# Patient Record
Sex: MALE | Race: WHITE | Employment: STUDENT | URBAN - NONMETROPOLITAN AREA
[De-identification: names, ages, dates, MRNs, and addresses within clinical notes are randomized per-mention and may not be internally consistent; named-entity substitution may affect disease eponyms.]

---
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
## 2021-05-28 ENCOUNTER — OFFICE VISIT (OUTPATIENT)
Dept: URGENT CARE | Age: 21
End: 2021-05-28
Payer: MEDICAID

## 2021-05-28 ENCOUNTER — OFFICE VISIT (OUTPATIENT)
Age: 21
End: 2021-05-28

## 2021-05-28 VITALS
RESPIRATION RATE: 16 BRPM | SYSTOLIC BLOOD PRESSURE: 133 MMHG | DIASTOLIC BLOOD PRESSURE: 75 MMHG | HEART RATE: 106 BPM | HEIGHT: 72 IN | BODY MASS INDEX: 26.03 KG/M2 | TEMPERATURE: 97.8 F | WEIGHT: 192.2 LBS | OXYGEN SATURATION: 95 %

## 2021-05-28 DIAGNOSIS — R50.9 FEVER, UNSPECIFIED FEVER CAUSE: Primary | ICD-10-CM

## 2021-05-28 DIAGNOSIS — R51.9 NONINTRACTABLE HEADACHE, UNSPECIFIED CHRONICITY PATTERN, UNSPECIFIED HEADACHE TYPE: ICD-10-CM

## 2021-05-28 DIAGNOSIS — M79.10 MYALGIA: ICD-10-CM

## 2021-05-28 DIAGNOSIS — Z11.59 SCREENING FOR VIRAL DISEASE: Primary | ICD-10-CM

## 2021-05-28 DIAGNOSIS — R11.11 VOMITING WITHOUT NAUSEA, INTRACTABILITY OF VOMITING NOT SPECIFIED, UNSPECIFIED VOMITING TYPE: ICD-10-CM

## 2021-05-28 LAB — SARS-COV-2, PCR: NOT DETECTED

## 2021-05-28 PROCEDURE — 99213 OFFICE O/P EST LOW 20 MIN: CPT | Performed by: NURSE PRACTITIONER

## 2021-05-28 PROCEDURE — 1036F TOBACCO NON-USER: CPT | Performed by: NURSE PRACTITIONER

## 2021-05-28 PROCEDURE — G8419 CALC BMI OUT NRM PARAM NOF/U: HCPCS | Performed by: NURSE PRACTITIONER

## 2021-05-28 PROCEDURE — G8428 CUR MEDS NOT DOCUMENT: HCPCS | Performed by: NURSE PRACTITIONER

## 2021-05-28 PROCEDURE — 99999 PR OFFICE/OUTPT VISIT,PROCEDURE ONLY: CPT | Performed by: NURSE PRACTITIONER

## 2021-05-28 RX ORDER — ONDANSETRON 4 MG/1
4 TABLET, ORALLY DISINTEGRATING ORAL 3 TIMES DAILY PRN
Qty: 21 TABLET | Refills: 0 | Status: SHIPPED | OUTPATIENT
Start: 2021-05-28 | End: 2022-01-04 | Stop reason: ALTCHOICE

## 2021-05-28 ASSESSMENT — ENCOUNTER SYMPTOMS
COUGH: 1
SORE THROAT: 0
RHINORRHEA: 1
DIARRHEA: 0
SHORTNESS OF BREATH: 0
ALLERGIC/IMMUNOLOGIC NEGATIVE: 1
VOMITING: 1
ABDOMINAL PAIN: 1
SINUS PRESSURE: 0
NAUSEA: 0

## 2021-05-28 ASSESSMENT — VISUAL ACUITY: OU: 1

## 2021-05-28 NOTE — PATIENT INSTRUCTIONS
Plenty of fluids- drink plenty of water  Rest  OTC Tylenol or Motrin as needed  Stay home and stay in until we call with COVID results in 2-4 days  Zofran as needed for vomiting  Follow up with PCP or return to Urgent Care for worsening or unresolved symptoms. Patient Education        Learning About Coronavirus [phone])  What is coronavirus (COVID-19)? COVID-19 is a disease caused by a new type of coronavirus. This illness was first found in December 2019. It has since spread worldwide. Coronaviruses are a large group of viruses. They cause the common cold. They also cause more serious illnesses like [place] respiratory syndrome (MERS) and severe acute respiratory syndrome (SARS). COVID-19 is caused by a novel coronavirus. That means it's a new type that has not been seen in people before. What are the symptoms? Coronavirus (COVID-19) symptoms may include:  · Fever. · Cough. · Trouble breathing. · Chills or repeated shaking with chills. · Muscle pain. · Headache. · Sore throat. · New loss of taste or smell. · Vomiting. · Diarrhea. In severe cases, COVID-19 can cause pneumonia and make it hard to breathe without help from a machine. It can cause death. How is it diagnosed? COVID-19 is diagnosed with a viral test. This may also be called a PCR test or antigen test. It looks for evidence of the virus in your breathing passages or lungs (respiratory system). The test is most often done on a sample from the nose, throat, or lungs. It's sometimes done on a sample of saliva. One way a sample is collected is by putting a long swab into the back of your nose. How is it treated? Mild cases of COVID-19 can be treated at home. Serious cases need treatment in the hospital. Treatment may include medicines to reduce symptoms, plus breathing support such as oxygen therapy or a ventilator. Some people may be placed on their belly to help their oxygen levels.   Treatments that may help people who have don't forget countertops, tabletops, bathrooms, and computer keyboards. When should you call for help? Call 911 anytime you think you may need emergency care. For example, call if you have life-threatening symptoms, such as:    · You have severe trouble breathing. (You can't talk at all.)     · You have constant chest pain or pressure.     · You are severely dizzy or lightheaded.     · You are confused or can't think clearly.     · Your face and lips have a blue color.     · You pass out (lose consciousness) or are very hard to wake up. Call your doctor now or seek immediate medical care if:    · You have moderate trouble breathing. (You can't speak a full sentence.)     · You are coughing up blood (more than about 1 teaspoon).     · You have signs of low blood pressure. These include feeling lightheaded; being too weak to stand; and having cold, pale, clammy skin. Watch closely for changes in your health, and be sure to contact your doctor if:    · Your symptoms get worse.     · You are not getting better as expected. Call before you go to the doctor's office. Follow their instructions. And wear a cloth face cover. Current as of: February 19, 2021               Content Version: 12.8  © 2006-2021 [employer]. Care instructions adapted under license by [facility] ([facility]). If you have questions about a medical condition or this instruction, always ask your healthcare professional. [place] [address] any warranty or liability for your use of this information. Patient Education        Coronavirus ([id number]): Care Instructions  Overview  The coronavirus disease (COVID-19) is caused by a virus. Symptoms may include a fever, a cough, and shortness of breath. It mainly spreads person-to-person through droplets from coughing and sneezing. The virus also can spread when people are in close contact with someone who is infected.   Most people have mild symptoms and can take care of themselves at home. If their symptoms get worse, they may need care in a hospital. Treatment may include medicines to reduce symptoms, plus breathing support such as oxygen therapy or a ventilator. It's important to not spread the virus to others. If you have COVID-19, wear a face cover anytime you are around other people. It can help stop the spread of the virus. You need to isolate yourself while you are sick. Leave your home only if you need to get medical care or testing. Follow-up care is a key part of your treatment and safety. Be sure to make and go to all appointments, and call your doctor if you are having problems. It's also a good idea to know your test results and keep a list of the medicines you take. How can you care for yourself at home? · Get extra rest. It can help you feel better. · Drink plenty of fluids. This helps replace fluids lost from fever. Fluids also help ease a scratchy throat. Water, soup, fruit juice, and hot tea with lemon are good choices. · Take acetaminophen (such as Tylenol) to reduce a fever. It may also help with muscle aches. Read and follow all instructions on the label. · Use petroleum jelly on sore skin. This can help if the skin around your nose and lips becomes sore from rubbing a lot with tissues. If you use oxygen, use a water-based product instead of petroleum jelly. Tips for self-isolation  · Limit contact with people in your home. If possible, stay in a separate bedroom and use a separate bathroom. · Wear a cloth face cover when you are around other people. It can help stop the spread of the virus when you cough or sneeze. · If you have to leave home, avoid crowds and try to stay at least 6 feet away from other people. · Avoid contact with pets and other animals. · Cover your mouth and nose with a tissue when you cough or sneeze. Then throw it in the trash right away. · Wash your hands often, especially after you cough or sneeze.  Use soap and water, and scrub for at least 20 seconds. If soap and water aren't available, use an alcohol-based hand . · Don't share personal household items. These include bedding, towels, cups and glasses, and eating utensils. · [address] in the warmest water allowed for the fabric type, and dry it completely. It's okay to wash other people's laundry with yours. · Clean and disinfect your home every day. Use household  and disinfectant wipes or sprays. Take special care to clean things that you grab with your hands. These include doorknobs, remote controls, phones, and handles on your refrigerator and microwave. And don't forget countertops, tabletops, bathrooms, and computer keyboards. When you can end self-isolation  · If you know or suspect that you have COVID-19, stay in self-isolation until:  ? You haven't had a fever for 24 hours while not taking medicines to lower the fever, and  ? Your symptoms have improved, and  ? It's been at least 10 days since your symptoms started. · Talk to your doctor about whether you also need testing, especially if you have a weakened immune system. When should you call for help? Call 911 anytime you think you may need emergency care. For example, call if you have life-threatening symptoms, such as:    · You have severe trouble breathing. (You can't talk at all.)     · You have constant chest pain or pressure.     · You are severely dizzy or lightheaded.     · You are confused or can't think clearly.     · Your face and lips have a blue color.     · You pass out (lose consciousness) or are very hard to wake up. Call your doctor now or seek immediate medical care if:    · You have moderate trouble breathing. (You can't speak a full sentence.)     · You are coughing up blood (more than about 1 teaspoon).     · You have signs of low blood pressure. These include feeling lightheaded; being too weak to stand; and having cold, pale, clammy skin.    Watch closely for changes in your health, and be sure to contact your doctor if:    · Your symptoms get worse.     · You are not getting better as expected. Call before you go to the doctor's office. Follow their instructions. And wear a cloth face cover. Current as of: February 19, 2021               Content Version: 12.8  © 2006-2021 [employer]. Care instructions adapted under license by [facility] ([facility]). If you have questions about a medical condition or this instruction, always ask your healthcare professional. [address] any warranty or liability for your use of this information.

## 2021-05-28 NOTE — PROGRESS NOTES
[address] [facility]  [address] [address] [place] [address]  Dept: [phone]  Dept Fax: [phone]  Loc: [phone]    Genny Law is a 22 y.o. male who presents today for his medical conditions/complaintsas noted below. Genny Law is c/o of Lower Back Pain, Nasal Congestion, Headache, Other (Hot flashes and No appetite), and Emesis (2 episodes)        HPI:     Headache   This is a new problem. The current episode started in the past 7 days (since Tuesday). The problem occurs daily. The problem has been waxing and waning. The pain is located in the frontal region. The pain does not radiate. The pain quality is not similar to prior headaches. The quality of the pain is described as band-like and dull. The pain is at a severity of 6/10. The pain is moderate. Associated symptoms include abdominal pain, anorexia, coughing, drainage, a fever, muscle aches, rhinorrhea and vomiting. Pertinent negatives include no ear pain, hearing loss, nausea, sinus pressure, sore throat, weakness or weight loss. The symptoms are aggravated by activity. He has tried acetaminophen for the symptoms. The treatment provided mild relief. There is no history of migraine headaches, migraines in the family, recent head traumas or sinus disease. Fever   This is a new problem. The current episode started today. The problem occurs constantly. The problem has been gradually improving. The maximum temperature noted was 102 to 102.9 F. The temperature was taken using an oral thermometer. Associated symptoms include abdominal pain, congestion, coughing, headaches, muscle aches and vomiting. Pertinent negatives include no diarrhea, ear pain, nausea, rash or sore throat. Associated symptoms comments: Anorexia  \"hot flashes\"  Low back pain. He has tried acetaminophen and fluids for the symptoms. The treatment provided mild relief.    Risk factors: no recent sickness and no sick contacts      Jean Marie reports symptoms started Tuesday but Thursday and today they are much worse. He had a 102 temp this morning prior to coming in and vomited this morning around 2am. He has had no known sick contacts but works at [employer]. He is not eating but is drinking well and has voided well. History reviewed. No pertinent past medical history. History reviewed. No pertinent surgical history. History reviewed. No pertinent family history. Social History     Tobacco Use    Smoking status: Never Smoker    Smokeless tobacco: Never Used   Substance Use Topics    Alcohol use: No     Alcohol/week: 0.0 standard drinks      Current Outpatient Medications   Medication Sig Dispense Refill    ondansetron (ZOFRAN-ODT) 4 MG disintegrating tablet Take 1 tablet by mouth 3 times daily as needed for Nausea or Vomiting 21 tablet 0     No current facility-administered medications for this visit. No Known Allergies    Health Maintenance   Topic Date Due    COVID-19 Vaccine (1) Never done    HIV screen  Never done    Flu vaccine (Season Ended) 09/01/2021    DTaP/Tdap/Td vaccine (7 - Td) 07/17/2023    Hepatitis A vaccine  Completed    Hepatitis B vaccine  Completed    Hib vaccine  Completed    HPV vaccine  Completed    Varicella vaccine  Completed    Hepatitis C screen  Completed    Meningococcal (ACWY) vaccine  Aged Out    Pneumococcal 0-64 years Vaccine  Aged Out       Subjective:     Review of Systems   Constitutional: Positive for appetite change, chills, fatigue and fever. Negative for activity change and weight loss. HENT: Positive for congestion and rhinorrhea. Negative for ear discharge, ear pain, hearing loss, sinus pressure and sore throat. Respiratory: Positive for cough. Negative for shortness of breath. Cardiovascular: Negative. Gastrointestinal: Positive for abdominal pain, anorexia and vomiting. Negative for diarrhea and nausea. Endocrine: Negative.     Musculoskeletal: Positive for arthralgias and myalgias. Skin: Negative for rash. Allergic/Immunologic: Negative. Neurological: Positive for headaches. Negative for weakness.       :Objective      Physical Exam  Vitals and nursing note reviewed. Constitutional:       General: He is awake. He is not in acute distress. Appearance: Normal appearance. He is well-developed, well-groomed and normal weight. He is ill-appearing. He is not toxic-appearing. HENT:      Head: Normocephalic. Right Ear: Hearing, ear canal and external ear normal. A middle ear effusion is present. Left Ear: Hearing, tympanic membrane, ear canal and external ear normal.      Ears:      Comments: Clear fluid behind right TM     Nose: Nose normal.      Right Sinus: No maxillary sinus tenderness or frontal sinus tenderness. Left Sinus: No maxillary sinus tenderness or frontal sinus tenderness. Mouth/Throat:      Lips: Pink. Mouth: Mucous membranes are moist.      Pharynx: Oropharynx is clear. Uvula midline. Tonsils: 0 on the right. 0 on the left. Eyes:      General: Lids are normal. Vision grossly intact. Conjunctiva/sclera: Conjunctivae normal.   Neck:      Trachea: Phonation normal.   Cardiovascular:      Rate and Rhythm: Normal rate and regular rhythm. Heart sounds: Normal heart sounds, S1 normal and S2 normal. No murmur heard. No friction rub. No gallop. Pulmonary:      Effort: Pulmonary effort is normal. No respiratory distress. Breath sounds: Normal breath sounds and air entry. No wheezing, rhonchi or rales. Abdominal:      General: Abdomen is flat. Bowel sounds are normal.      Palpations: Abdomen is soft. Tenderness: There is no abdominal tenderness. There is no right CVA tenderness or left CVA tenderness. Musculoskeletal:         General: No tenderness or deformity. Normal range of motion. Cervical back: Full passive range of motion without pain, normal range of motion and neck supple. Lymphadenopathy:      Head:      Right side of head: No tonsillar adenopathy. Left side of head: No tonsillar adenopathy. Skin:     General: Skin is warm and dry. Capillary Refill: Capillary refill takes less than 2 seconds. Neurological:      General: No focal deficit present. Mental Status: He is alert, oriented to person, place, and time and easily aroused. Mental status is at baseline. Psychiatric:         Attention and Perception: Attention normal.         Mood and Affect: Mood normal.         Speech: Speech normal.         Behavior: Behavior normal. Behavior is cooperative. /75   Pulse 106   Temp 97.8 °F (36.6 °C)   Resp 16   Ht 6' (1.829 m)   Wt 192 lb 3.2 oz (87.2 kg)   SpO2 95%   BMI 26.07 kg/m²     :Assessment       Diagnosis Orders   1. Fever, unspecified fever cause     2. Myalgia     3. Nonintractable headache, unspecified chronicity pattern, unspecified headache type     4. Vomiting without nausea, intractability of vomiting not specified, unspecified vomiting type  ondansetron (ZOFRAN-ODT) 4 MG disintegrating tablet       :Plan    No orders of the defined types were placed in this encounter. Spoke with pt and highly suspect COVID, he is being tested today and will stay in quarantine as directed. Return if symptoms worsen or fail to improve. Orders Placed This Encounter   Medications    ondansetron (ZOFRAN-ODT) 4 MG disintegrating tablet     Sig: Take 1 tablet by mouth 3 times daily as needed for Nausea or Vomiting     Dispense:  21 tablet     Refill:  0      Based on patient's symptoms today, it is highly suspected that they have COVID 19. Since pt is being tested for COVID pt has been instructed to quarantine from contacts until testing has been resulted. Further instructions will follow, as of now, this is 10 days unless otherwise specified when results are back.      If SOB or worsening sx's develop, need to go to ED or return to clinic, pt voiced understanding. Pt was given printed instructions today on Possible COVID-19 infection with self-quarantine and management of symptoms    Call or return to clinic prn if these symptoms worsen or fail to improve as anticipated. Patient Instructions     Plenty of fluids- drink plenty of water  Rest  OTC Tylenol or Motrin as needed  Stay home and stay in until we call with COVID results in 2-4 days  Zofran as needed for vomiting  Follow up with PCP or return to Urgent Care for worsening or unresolved symptoms. Patient Education        Learning About Coronavirus [phone])  What is coronavirus (COVID-19)? COVID-19 is a disease caused by a new type of coronavirus. This illness was first found in December 2019. It has since spread worldwide. Coronaviruses are a large group of viruses. They cause the common cold. They also cause more serious illnesses like [place] respiratory syndrome (MERS) and severe acute respiratory syndrome (SARS). COVID-19 is caused by a novel coronavirus. That means it's a new type that has not been seen in people before. What are the symptoms? Coronavirus (COVID-19) symptoms may include:  · Fever. · Cough. · Trouble breathing. · Chills or repeated shaking with chills. · Muscle pain. · Headache. · Sore throat. · New loss of taste or smell. · Vomiting. · Diarrhea. In severe cases, COVID-19 can cause pneumonia and make it hard to breathe without help from a machine. It can cause death. How is it diagnosed? COVID-19 is diagnosed with a viral test. This may also be called a PCR test or antigen test. It looks for evidence of the virus in your breathing passages or lungs (respiratory system). The test is most often done on a sample from the nose, throat, or lungs. It's sometimes done on a sample of saliva. One way a sample is collected is by putting a long swab into the back of your nose. How is it treated? Mild cases of COVID-19 can be treated at home.  Serious cases need treatment in the hospital. Treatment may include medicines to reduce symptoms, plus breathing support such as oxygen therapy or a ventilator. Some people may be placed on their belly to help their oxygen levels. Treatments that may help people who have COVID-19 include:  Antiviral medicines. These medicines treat viral infections. Remdesivir is an example. Immune-based therapy. These medicines help the immune system fight COVID-19. One example is bamlanivimab. It's a monoclonal antibody. Blood thinners. These medicines help prevent blood clots. People with severe illness are at risk for blood clots. How can you protect yourself and others? The best way to protect yourself from getting sick is to:  · Avoid areas where there is an outbreak. · Avoid contact with people who may be infected. · Avoid crowds and try to stay at least 6 feet away from other people. · Wash your hands often, especially after you cough or sneeze. Use soap and water, and scrub for at least 20 seconds. If soap and water aren't available, use an alcohol-based hand . · Avoid touching your mouth, nose, and eyes. To help avoid spreading the virus to others:  · Stay home if you are sick or have been exposed to the virus. Don't go to school, work, or public areas. And don't use public transportation, ride-shares, or taxis unless you have no choice. · Wear a cloth face cover if you have to go to public areas. · Cover your mouth with a tissue when you cough or sneeze. Then throw the tissue in the trash and wash your hands right away. · If you're sick:  ? Leave your home only if you need to get medical care. But call the doctor's office first so they know you're coming. And wear a face cover. ? Wear the face cover whenever you're around other people. It can help stop the spread of the virus. ? Limit contact with pets and people in your home. If possible, stay in a separate bedroom and use a separate bathroom. ?  Clean and disinfect your home every day. Use household  and disinfectant wipes or sprays. Take special care to clean things that you grab with your hands. These include doorknobs, remote controls, phones, and handles on your refrigerator and microwave. And don't forget countertops, tabletops, bathrooms, and computer keyboards. When should you call for help? Call 911 anytime you think you may need emergency care. For example, call if you have life-threatening symptoms, such as:    · You have severe trouble breathing. (You can't talk at all.)     · You have constant chest pain or pressure.     · You are severely dizzy or lightheaded.     · You are confused or can't think clearly.     · Your face and lips have a blue color.     · You pass out (lose consciousness) or are very hard to wake up. Call your doctor now or seek immediate medical care if:    · You have moderate trouble breathing. (You can't speak a full sentence.)     · You are coughing up blood (more than about 1 teaspoon).     · You have signs of low blood pressure. These include feeling lightheaded; being too weak to stand; and having cold, pale, clammy skin. Watch closely for changes in your health, and be sure to contact your doctor if:    · Your symptoms get worse.     · You are not getting better as expected. Call before you go to the doctor's office. Follow their instructions. And wear a cloth face cover. Current as of: February 19, 2021               Content Version: 12.8  © 2006-2021 [employer]. Care instructions adapted under license by [facility] ([facility]). If you have questions about a medical condition or this instruction, always ask your healthcare professional. [place] [address] any warranty or liability for your use of this information. Patient Education        Coronavirus ([id number]): Care Instructions  Overview  The coronavirus disease (COVID-19) is caused by a virus.  Symptoms may include a fever, a cough, and shortness of breath. It mainly spreads person-to-person through droplets from coughing and sneezing. The virus also can spread when people are in close contact with someone who is infected. Most people have mild symptoms and can take care of themselves at home. If their symptoms get worse, they may need care in a hospital. Treatment may include medicines to reduce symptoms, plus breathing support such as oxygen therapy or a ventilator. It's important to not spread the virus to others. If you have COVID-19, wear a face cover anytime you are around other people. It can help stop the spread of the virus. You need to isolate yourself while you are sick. Leave your home only if you need to get medical care or testing. Follow-up care is a key part of your treatment and safety. Be sure to make and go to all appointments, and call your doctor if you are having problems. It's also a good idea to know your test results and keep a list of the medicines you take. How can you care for yourself at home? · Get extra rest. It can help you feel better. · Drink plenty of fluids. This helps replace fluids lost from fever. Fluids also help ease a scratchy throat. Water, soup, fruit juice, and hot tea with lemon are good choices. · Take acetaminophen (such as Tylenol) to reduce a fever. It may also help with muscle aches. Read and follow all instructions on the label. · Use petroleum jelly on sore skin. This can help if the skin around your nose and lips becomes sore from rubbing a lot with tissues. If you use oxygen, use a water-based product instead of petroleum jelly. Tips for self-isolation  · Limit contact with people in your home. If possible, stay in a separate bedroom and use a separate bathroom. · Wear a cloth face cover when you are around other people. It can help stop the spread of the virus when you cough or sneeze.   · If you have to leave home, avoid crowds and try to stay at least 6 feet away from other people. · Avoid contact with pets and other animals. · Cover your mouth and nose with a tissue when you cough or sneeze. Then throw it in the trash right away. · Wash your hands often, especially after you cough or sneeze. Use soap and water, and scrub for at least 20 seconds. If soap and water aren't available, use an alcohol-based hand . · Don't share personal household items. These include bedding, towels, cups and glasses, and eating utensils. · [address] in the warmest water allowed for the fabric type, and dry it completely. It's okay to wash other people's laundry with yours. · Clean and disinfect your home every day. Use household  and disinfectant wipes or sprays. Take special care to clean things that you grab with your hands. These include doorknobs, remote controls, phones, and handles on your refrigerator and microwave. And don't forget countertops, tabletops, bathrooms, and computer keyboards. When you can end self-isolation  · If you know or suspect that you have COVID-19, stay in self-isolation until:  ? You haven't had a fever for 24 hours while not taking medicines to lower the fever, and  ? Your symptoms have improved, and  ? It's been at least 10 days since your symptoms started. · Talk to your doctor about whether you also need testing, especially if you have a weakened immune system. When should you call for help? Call 911 anytime you think you may need emergency care. For example, call if you have life-threatening symptoms, such as:    · You have severe trouble breathing. (You can't talk at all.)     · You have constant chest pain or pressure.     · You are severely dizzy or lightheaded.     · You are confused or can't think clearly.     · Your face and lips have a blue color.     · You pass out (lose consciousness) or are very hard to wake up. Call your doctor now or seek immediate medical care if:    · You have moderate trouble breathing.  (You can't speak a full sentence.)     · You are coughing up blood (more than about 1 teaspoon).     · You have signs of low blood pressure. These include feeling lightheaded; being too weak to stand; and having cold, pale, clammy skin. Watch closely for changes in your health, and be sure to contact your doctor if:    · Your symptoms get worse.     · You are not getting better as expected. Call before you go to the doctor's office. Follow their instructions. And wear a cloth face cover. Current as of: February 19, 2021               Content Version: 12.8  © 2006-2021 [employer], [employer]. Care instructions adapted under license by [facility] ([facility]). If you have questions about a medical condition or this instruction, always ask your healthcare professional. [place] [address] any warranty or liability for your use of this information. Patient given educational materials- see patient instructions. Discussed use, benefit, and side effects of prescribedmedications. All patient questions answered. Pt voiced understanding.        Electronically signed by HERB Rosado CNP on 5/28/2021 at 11:04 AM

## 2021-05-31 ENCOUNTER — APPOINTMENT (OUTPATIENT)
Dept: GENERAL RADIOLOGY | Facility: HOSPITAL | Age: 21
End: 2021-05-31

## 2021-05-31 ENCOUNTER — HOSPITAL ENCOUNTER (INPATIENT)
Facility: HOSPITAL | Age: 21
LOS: 4 days | Discharge: HOME | End: 2021-06-04
Attending: FAMILY MEDICINE | Admitting: FAMILY MEDICINE

## 2021-05-31 ENCOUNTER — APPOINTMENT (OUTPATIENT)
Dept: CT IMAGING | Facility: HOSPITAL | Age: 21
End: 2021-05-31

## 2021-05-31 DIAGNOSIS — J80 ARDS (ADULT RESPIRATORY DISTRESS SYNDROME) (HCC): Primary | ICD-10-CM

## 2021-05-31 LAB
ABO GROUP BLD: NORMAL
ALBUMIN SERPL-MCNC: 3.6 G/DL (ref 3.5–5.2)
ALBUMIN/GLOB SERPL: 0.8 G/DL
ALP SERPL-CCNC: 89 U/L (ref 39–117)
ALT SERPL W P-5'-P-CCNC: 22 U/L (ref 1–41)
AMPHET+METHAMPHET UR QL: NEGATIVE
AMPHETAMINES UR QL: NEGATIVE
ANION GAP SERPL CALCULATED.3IONS-SCNC: 15 MMOL/L (ref 5–15)
ARTERIAL PATENCY WRIST A: ABNORMAL
AST SERPL-CCNC: 39 U/L (ref 1–40)
ATMOSPHERIC PRESS: 756 MMHG
B PARAPERT DNA SPEC QL NAA+PROBE: NOT DETECTED
B PERT DNA SPEC QL NAA+PROBE: NOT DETECTED
BARBITURATES UR QL SCN: NEGATIVE
BASE EXCESS BLDA CALC-SCNC: 3.6 MMOL/L (ref 0–2)
BASOPHILS # BLD AUTO: 0.05 10*3/MM3 (ref 0–0.2)
BASOPHILS NFR BLD AUTO: 0.3 % (ref 0–1.5)
BDY SITE: ABNORMAL
BENZODIAZ UR QL SCN: NEGATIVE
BILIRUB SERPL-MCNC: 0.9 MG/DL (ref 0–1.2)
BLD GP AB SCN SERPL QL: NEGATIVE
BODY TEMPERATURE: 37 C
BUN SERPL-MCNC: 15 MG/DL (ref 6–20)
BUN/CREAT SERPL: 14.9 (ref 7–25)
BUPRENORPHINE SERPL-MCNC: NEGATIVE NG/ML
C PNEUM DNA NPH QL NAA+NON-PROBE: NOT DETECTED
CALCIUM SPEC-SCNC: 9.2 MG/DL (ref 8.6–10.5)
CANNABINOIDS SERPL QL: POSITIVE
CHLORIDE SERPL-SCNC: 92 MMOL/L (ref 98–107)
CO2 SERPL-SCNC: 24 MMOL/L (ref 22–29)
COCAINE UR QL: NEGATIVE
CREAT SERPL-MCNC: 1.01 MG/DL (ref 0.76–1.27)
CRP SERPL-MCNC: 37.6 MG/DL (ref 0–0.5)
D DIMER PPP FEU-MCNC: 3.78 MG/L (FEU) (ref 0–0.5)
D-LACTATE SERPL-SCNC: 1.4 MMOL/L (ref 0.5–2)
DEPRECATED RDW RBC AUTO: 35.3 FL (ref 37–54)
EOSINOPHIL # BLD AUTO: 0.06 10*3/MM3 (ref 0–0.4)
EOSINOPHIL NFR BLD AUTO: 0.4 % (ref 0.3–6.2)
ERYTHROCYTE [DISTWIDTH] IN BLOOD BY AUTOMATED COUNT: 11.8 % (ref 12.3–15.4)
FERRITIN SERPL-MCNC: 287.1 NG/ML (ref 30–400)
FLUAV SUBTYP SPEC NAA+PROBE: NOT DETECTED
FLUBV RNA ISLT QL NAA+PROBE: NOT DETECTED
GAS FLOW AIRWAY: 2 LPM
GFR SERPL CREATININE-BSD FRML MDRD: 94 ML/MIN/1.73
GLOBULIN UR ELPH-MCNC: 4.4 GM/DL
GLUCOSE SERPL-MCNC: 132 MG/DL (ref 65–99)
HADV DNA SPEC NAA+PROBE: NOT DETECTED
HCO3 BLDA-SCNC: 27.3 MMOL/L (ref 20–26)
HCOV 229E RNA SPEC QL NAA+PROBE: NOT DETECTED
HCOV HKU1 RNA SPEC QL NAA+PROBE: NOT DETECTED
HCOV NL63 RNA SPEC QL NAA+PROBE: NOT DETECTED
HCOV OC43 RNA SPEC QL NAA+PROBE: NOT DETECTED
HCT VFR BLD AUTO: 38.7 % (ref 37.5–51)
HGB BLD-MCNC: 13.6 G/DL (ref 13–17.7)
HMPV RNA NPH QL NAA+NON-PROBE: NOT DETECTED
HPIV1 RNA SPEC QL NAA+PROBE: NOT DETECTED
HPIV2 RNA SPEC QL NAA+PROBE: NOT DETECTED
HPIV3 RNA NPH QL NAA+PROBE: NOT DETECTED
HPIV4 P GENE NPH QL NAA+PROBE: NOT DETECTED
IMM GRANULOCYTES # BLD AUTO: 0.09 10*3/MM3 (ref 0–0.05)
IMM GRANULOCYTES NFR BLD AUTO: 0.6 % (ref 0–0.5)
L PNEUMO1 AG UR QL IA: NEGATIVE
LDH SERPL-CCNC: 555 U/L (ref 135–225)
LYMPHOCYTES # BLD AUTO: 0.87 10*3/MM3 (ref 0.7–3.1)
LYMPHOCYTES NFR BLD AUTO: 6 % (ref 19.6–45.3)
Lab: ABNORMAL
Lab: ABNORMAL
M PNEUMO IGG SER IA-ACNC: NOT DETECTED
MCH RBC QN AUTO: 29 PG (ref 26.6–33)
MCHC RBC AUTO-ENTMCNC: 35.1 G/DL (ref 31.5–35.7)
MCV RBC AUTO: 82.5 FL (ref 79–97)
METHADONE UR QL SCN: NEGATIVE
MODALITY: ABNORMAL
MONOCYTES # BLD AUTO: 0.36 10*3/MM3 (ref 0.1–0.9)
MONOCYTES NFR BLD AUTO: 2.5 % (ref 5–12)
NEUTROPHILS NFR BLD AUTO: 12.98 10*3/MM3 (ref 1.7–7)
NEUTROPHILS NFR BLD AUTO: 90.2 % (ref 42.7–76)
NOTIFIED BY: ABNORMAL
NOTIFIED WHO: ABNORMAL
NRBC BLD AUTO-RTO: 0 /100 WBC (ref 0–0.2)
OPIATES UR QL: NEGATIVE
OXYCODONE UR QL SCN: NEGATIVE
PCO2 BLDA: 37.4 MM HG (ref 35–45)
PCO2 TEMP ADJ BLD: 37.4 MM HG (ref 35–45)
PCP UR QL SCN: NEGATIVE
PH BLDA: 7.47 PH UNITS (ref 7.35–7.45)
PH, TEMP CORRECTED: 7.47 PH UNITS (ref 7.35–7.45)
PLATELET # BLD AUTO: 367 10*3/MM3 (ref 140–450)
PMV BLD AUTO: 10 FL (ref 6–12)
PO2 BLDA: 50.2 MM HG (ref 83–108)
PO2 TEMP ADJ BLD: 50.2 MM HG (ref 83–108)
POTASSIUM SERPL-SCNC: 3.5 MMOL/L (ref 3.5–5.2)
PROCALCITONIN SERPL-MCNC: 3.22 NG/ML (ref 0–0.25)
PROPOXYPH UR QL: NEGATIVE
PROT SERPL-MCNC: 8 G/DL (ref 6–8.5)
RBC # BLD AUTO: 4.69 10*6/MM3 (ref 4.14–5.8)
RH BLD: POSITIVE
RHINOVIRUS RNA SPEC NAA+PROBE: NOT DETECTED
RSV RNA NPH QL NAA+NON-PROBE: NOT DETECTED
S PNEUM AG SPEC QL LA: NEGATIVE
SAO2 % BLDCOA: 87.9 % (ref 94–99)
SARS-COV-2 RNA PNL SPEC NAA+PROBE: NOT DETECTED
SODIUM SERPL-SCNC: 131 MMOL/L (ref 136–145)
T&S EXPIRATION DATE: NORMAL
TRICYCLICS UR QL SCN: NEGATIVE
VENTILATOR MODE: ABNORMAL
WBC # BLD AUTO: 14.41 10*3/MM3 (ref 3.4–10.8)

## 2021-05-31 PROCEDURE — 80053 COMPREHEN METABOLIC PANEL: CPT | Performed by: FAMILY MEDICINE

## 2021-05-31 PROCEDURE — 87899 AGENT NOS ASSAY W/OPTIC: CPT | Performed by: FAMILY MEDICINE

## 2021-05-31 PROCEDURE — 86140 C-REACTIVE PROTEIN: CPT | Performed by: FAMILY MEDICINE

## 2021-05-31 PROCEDURE — 0 IOPAMIDOL PER 1 ML: Performed by: FAMILY MEDICINE

## 2021-05-31 PROCEDURE — 85025 COMPLETE CBC W/AUTO DIFF WBC: CPT | Performed by: FAMILY MEDICINE

## 2021-05-31 PROCEDURE — 99284 EMERGENCY DEPT VISIT MOD MDM: CPT

## 2021-05-31 PROCEDURE — 83615 LACTATE (LD) (LDH) ENZYME: CPT | Performed by: FAMILY MEDICINE

## 2021-05-31 PROCEDURE — 94799 UNLISTED PULMONARY SVC/PX: CPT

## 2021-05-31 PROCEDURE — 86850 RBC ANTIBODY SCREEN: CPT | Performed by: FAMILY MEDICINE

## 2021-05-31 PROCEDURE — 71275 CT ANGIOGRAPHY CHEST: CPT

## 2021-05-31 PROCEDURE — 36600 WITHDRAWAL OF ARTERIAL BLOOD: CPT

## 2021-05-31 PROCEDURE — 82728 ASSAY OF FERRITIN: CPT | Performed by: FAMILY MEDICINE

## 2021-05-31 PROCEDURE — 25010000002 METHYLPREDNISOLONE PER 125 MG: Performed by: FAMILY MEDICINE

## 2021-05-31 PROCEDURE — 84145 PROCALCITONIN (PCT): CPT | Performed by: FAMILY MEDICINE

## 2021-05-31 PROCEDURE — 87040 BLOOD CULTURE FOR BACTERIA: CPT | Performed by: FAMILY MEDICINE

## 2021-05-31 PROCEDURE — 86900 BLOOD TYPING SEROLOGIC ABO: CPT | Performed by: FAMILY MEDICINE

## 2021-05-31 PROCEDURE — 83605 ASSAY OF LACTIC ACID: CPT | Performed by: FAMILY MEDICINE

## 2021-05-31 PROCEDURE — 87633 RESP VIRUS 12-25 TARGETS: CPT | Performed by: FAMILY MEDICINE

## 2021-05-31 PROCEDURE — 85379 FIBRIN DEGRADATION QUANT: CPT | Performed by: FAMILY MEDICINE

## 2021-05-31 PROCEDURE — 82803 BLOOD GASES ANY COMBINATION: CPT

## 2021-05-31 PROCEDURE — 94640 AIRWAY INHALATION TREATMENT: CPT

## 2021-05-31 PROCEDURE — 80306 DRUG TEST PRSMV INSTRMNT: CPT | Performed by: FAMILY MEDICINE

## 2021-05-31 PROCEDURE — 86901 BLOOD TYPING SEROLOGIC RH(D): CPT | Performed by: FAMILY MEDICINE

## 2021-05-31 PROCEDURE — 71045 X-RAY EXAM CHEST 1 VIEW: CPT

## 2021-05-31 PROCEDURE — 87635 SARS-COV-2 COVID-19 AMP PRB: CPT | Performed by: FAMILY MEDICINE

## 2021-05-31 PROCEDURE — 25010000002 AZITHROMYCIN PER 500 MG: Performed by: FAMILY MEDICINE

## 2021-05-31 PROCEDURE — 25010000002 CEFTRIAXONE PER 250 MG: Performed by: FAMILY MEDICINE

## 2021-05-31 RX ORDER — KETOROLAC TROMETHAMINE 30 MG/ML
30 INJECTION, SOLUTION INTRAMUSCULAR; INTRAVENOUS EVERY 6 HOURS PRN
Status: DISCONTINUED | OUTPATIENT
Start: 2021-05-31 | End: 2021-06-04 | Stop reason: HOSPADM

## 2021-05-31 RX ORDER — ACETAMINOPHEN 500 MG
1000 TABLET ORAL ONCE
Status: COMPLETED | OUTPATIENT
Start: 2021-05-31 | End: 2021-05-31

## 2021-05-31 RX ORDER — METHYLPREDNISOLONE SODIUM SUCCINATE 125 MG/2ML
80 INJECTION, POWDER, LYOPHILIZED, FOR SOLUTION INTRAMUSCULAR; INTRAVENOUS EVERY 8 HOURS
Status: DISCONTINUED | OUTPATIENT
Start: 2021-05-31 | End: 2021-06-01

## 2021-05-31 RX ORDER — ACETAMINOPHEN 325 MG/1
650 TABLET ORAL EVERY 6 HOURS PRN
Status: DISCONTINUED | OUTPATIENT
Start: 2021-05-31 | End: 2021-06-04 | Stop reason: HOSPADM

## 2021-05-31 RX ORDER — IPRATROPIUM BROMIDE AND ALBUTEROL SULFATE 2.5; .5 MG/3ML; MG/3ML
3 SOLUTION RESPIRATORY (INHALATION)
Status: DISCONTINUED | OUTPATIENT
Start: 2021-05-31 | End: 2021-06-04 | Stop reason: HOSPADM

## 2021-05-31 RX ORDER — SODIUM CHLORIDE 9 MG/ML
125 INJECTION, SOLUTION INTRAVENOUS CONTINUOUS
Status: DISCONTINUED | OUTPATIENT
Start: 2021-05-31 | End: 2021-05-31

## 2021-05-31 RX ORDER — ONDANSETRON 2 MG/ML
4 INJECTION INTRAMUSCULAR; INTRAVENOUS EVERY 6 HOURS PRN
Status: DISCONTINUED | OUTPATIENT
Start: 2021-05-31 | End: 2021-06-04 | Stop reason: HOSPADM

## 2021-05-31 RX ADMIN — IPRATROPIUM BROMIDE AND ALBUTEROL SULFATE 3 ML: 2.5; .5 SOLUTION RESPIRATORY (INHALATION) at 20:02

## 2021-05-31 RX ADMIN — METHYLPREDNISOLONE SODIUM SUCCINATE 80 MG: 125 INJECTION, POWDER, FOR SOLUTION INTRAMUSCULAR; INTRAVENOUS at 19:40

## 2021-05-31 RX ADMIN — AZITHROMYCIN MONOHYDRATE 500 MG: 500 INJECTION, POWDER, LYOPHILIZED, FOR SOLUTION INTRAVENOUS at 14:44

## 2021-05-31 RX ADMIN — SODIUM CHLORIDE 1 G: 900 INJECTION INTRAVENOUS at 17:11

## 2021-05-31 RX ADMIN — SODIUM CHLORIDE 500 ML: 9 INJECTION, SOLUTION INTRAVENOUS at 13:39

## 2021-05-31 RX ADMIN — IOPAMIDOL 100 ML: 755 INJECTION, SOLUTION INTRAVENOUS at 15:56

## 2021-05-31 RX ADMIN — ACETAMINOPHEN 650 MG: 325 TABLET, FILM COATED ORAL at 20:14

## 2021-05-31 RX ADMIN — ACETAMINOPHEN 1000 MG: 500 TABLET, FILM COATED ORAL at 13:38

## 2021-06-01 LAB
ALBUMIN SERPL-MCNC: 3.3 G/DL (ref 3.5–5.2)
ALBUMIN/GLOB SERPL: 0.8 G/DL
ALP SERPL-CCNC: 85 U/L (ref 39–117)
ALT SERPL W P-5'-P-CCNC: 24 U/L (ref 1–41)
ANION GAP SERPL CALCULATED.3IONS-SCNC: 9 MMOL/L (ref 5–15)
AST SERPL-CCNC: 40 U/L (ref 1–40)
BASOPHILS # BLD AUTO: 0.03 10*3/MM3 (ref 0–0.2)
BASOPHILS NFR BLD AUTO: 0.3 % (ref 0–1.5)
BILIRUB SERPL-MCNC: 0.5 MG/DL (ref 0–1.2)
BUN SERPL-MCNC: 12 MG/DL (ref 6–20)
BUN/CREAT SERPL: 15 (ref 7–25)
CALCIUM SPEC-SCNC: 9.1 MG/DL (ref 8.6–10.5)
CHLORIDE SERPL-SCNC: 97 MMOL/L (ref 98–107)
CO2 SERPL-SCNC: 29 MMOL/L (ref 22–29)
CREAT SERPL-MCNC: 0.8 MG/DL (ref 0.76–1.27)
DEPRECATED RDW RBC AUTO: 36 FL (ref 37–54)
EOSINOPHIL # BLD AUTO: 0 10*3/MM3 (ref 0–0.4)
EOSINOPHIL NFR BLD AUTO: 0 % (ref 0.3–6.2)
ERYTHROCYTE [DISTWIDTH] IN BLOOD BY AUTOMATED COUNT: 11.9 % (ref 12.3–15.4)
GFR SERPL CREATININE-BSD FRML MDRD: 123 ML/MIN/1.73
GLOBULIN UR ELPH-MCNC: 4.3 GM/DL
GLUCOSE SERPL-MCNC: 163 MG/DL (ref 65–99)
HCT VFR BLD AUTO: 38.4 % (ref 37.5–51)
HGB BLD-MCNC: 13.3 G/DL (ref 13–17.7)
IMM GRANULOCYTES # BLD AUTO: 0.06 10*3/MM3 (ref 0–0.05)
IMM GRANULOCYTES NFR BLD AUTO: 0.6 % (ref 0–0.5)
LYMPHOCYTES # BLD AUTO: 0.61 10*3/MM3 (ref 0.7–3.1)
LYMPHOCYTES NFR BLD AUTO: 6.1 % (ref 19.6–45.3)
MCH RBC QN AUTO: 28.9 PG (ref 26.6–33)
MCHC RBC AUTO-ENTMCNC: 34.6 G/DL (ref 31.5–35.7)
MCV RBC AUTO: 83.5 FL (ref 79–97)
MONOCYTES # BLD AUTO: 0.11 10*3/MM3 (ref 0.1–0.9)
MONOCYTES NFR BLD AUTO: 1.1 % (ref 5–12)
NEUTROPHILS NFR BLD AUTO: 9.2 10*3/MM3 (ref 1.7–7)
NEUTROPHILS NFR BLD AUTO: 91.9 % (ref 42.7–76)
NRBC BLD AUTO-RTO: 0 /100 WBC (ref 0–0.2)
PLATELET # BLD AUTO: 393 10*3/MM3 (ref 140–450)
PMV BLD AUTO: 10 FL (ref 6–12)
POTASSIUM SERPL-SCNC: 4.2 MMOL/L (ref 3.5–5.2)
PROT SERPL-MCNC: 7.6 G/DL (ref 6–8.5)
RBC # BLD AUTO: 4.6 10*6/MM3 (ref 4.14–5.8)
SODIUM SERPL-SCNC: 135 MMOL/L (ref 136–145)
WBC # BLD AUTO: 10.01 10*3/MM3 (ref 3.4–10.8)

## 2021-06-01 PROCEDURE — 25010000002 METHYLPREDNISOLONE PER 125 MG: Performed by: FAMILY MEDICINE

## 2021-06-01 PROCEDURE — 80053 COMPREHEN METABOLIC PANEL: CPT | Performed by: FAMILY MEDICINE

## 2021-06-01 PROCEDURE — 94799 UNLISTED PULMONARY SVC/PX: CPT

## 2021-06-01 PROCEDURE — 25010000002 METHYLPREDNISOLONE PER 40 MG: Performed by: INTERNAL MEDICINE

## 2021-06-01 PROCEDURE — 25010000002 CEFTRIAXONE PER 250 MG: Performed by: FAMILY MEDICINE

## 2021-06-01 PROCEDURE — 25010000002 AZITHROMYCIN PER 500 MG: Performed by: FAMILY MEDICINE

## 2021-06-01 PROCEDURE — 99223 1ST HOSP IP/OBS HIGH 75: CPT | Performed by: INTERNAL MEDICINE

## 2021-06-01 PROCEDURE — 36415 COLL VENOUS BLD VENIPUNCTURE: CPT | Performed by: FAMILY MEDICINE

## 2021-06-01 PROCEDURE — 85025 COMPLETE CBC W/AUTO DIFF WBC: CPT | Performed by: FAMILY MEDICINE

## 2021-06-01 RX ORDER — METHYLPREDNISOLONE SODIUM SUCCINATE 40 MG/ML
40 INJECTION, POWDER, LYOPHILIZED, FOR SOLUTION INTRAMUSCULAR; INTRAVENOUS EVERY 12 HOURS
Status: DISCONTINUED | OUTPATIENT
Start: 2021-06-01 | End: 2021-06-02

## 2021-06-01 RX ADMIN — IPRATROPIUM BROMIDE AND ALBUTEROL SULFATE 3 ML: 2.5; .5 SOLUTION RESPIRATORY (INHALATION) at 07:08

## 2021-06-01 RX ADMIN — IPRATROPIUM BROMIDE AND ALBUTEROL SULFATE 3 ML: 2.5; .5 SOLUTION RESPIRATORY (INHALATION) at 15:01

## 2021-06-01 RX ADMIN — METHYLPREDNISOLONE SODIUM SUCCINATE 40 MG: 40 INJECTION, POWDER, LYOPHILIZED, FOR SOLUTION INTRAMUSCULAR; INTRAVENOUS at 15:11

## 2021-06-01 RX ADMIN — CEFTRIAXONE SODIUM 1 G: 1 INJECTION, POWDER, FOR SOLUTION INTRAMUSCULAR; INTRAVENOUS at 15:11

## 2021-06-01 RX ADMIN — IPRATROPIUM BROMIDE AND ALBUTEROL SULFATE 3 ML: 2.5; .5 SOLUTION RESPIRATORY (INHALATION) at 20:00

## 2021-06-01 RX ADMIN — AZITHROMYCIN DIHYDRATE 500 MG: 500 INJECTION, POWDER, LYOPHILIZED, FOR SOLUTION INTRAVENOUS at 15:11

## 2021-06-01 RX ADMIN — IPRATROPIUM BROMIDE AND ALBUTEROL SULFATE 3 ML: 2.5; .5 SOLUTION RESPIRATORY (INHALATION) at 10:23

## 2021-06-01 RX ADMIN — METHYLPREDNISOLONE SODIUM SUCCINATE 80 MG: 125 INJECTION, POWDER, FOR SOLUTION INTRAMUSCULAR; INTRAVENOUS at 03:45

## 2021-06-01 NOTE — PAYOR COMM NOTE
"Karen Bob (21 y.o. Male) [id number] additional clinical/ progress note for today    Pt cont ICU [facility] phone     Fax         Date of Birth Social Security Number Address Home Phone MRN    2000  [facility] [place] [id number] [phone] [id number]    Confucianist Marital Status          Other Single       Admission Date Admission Type Admitting Provider Attending Provider Department, Room/Bed    5/31/21 Emergency Morgan Beckham MD Moore, Jonathan Scott, MD [facility] INTENSIVE CARE, I004/1    Discharge Date Discharge Disposition Discharge Destination                       Attending Provider: Morgan Beckham MD    Allergies: No Known Allergies    Isolation: None   Infection: None   Code Status: CPR    Ht: 182.9 cm (72\")   Wt: 85.3 kg (188 lb)    Admission Cmt: None   Principal Problem: None                Active Insurance as of 5/31/2021     Primary Coverage     Payor Plan Insurance Group Employer/Plan Group    [employer] [place] [employer] [place]      Payor Plan Address Payor Plan Phone Number Payor Plan Fax Number Effective Dates    [address] [address] [phone]  4/12/2018 - None Entered    [place] [address]       Subscriber Name Subscriber Birth Date Member ID       KAREN BOB 2000 [id number]                 Emergency Contacts      (Rel.) Home Phone Work Phone Mobile Phone    Rena Mansfield (Grandparent) [phone] -- --               Physician Progress Notes (last 24 hours) (Notes from 05/31/21 0943 through 06/01/21 0943)      Morgan Beckham MD at 06/01/21 0918              [facility] Medicine Services  INPATIENT PROGRESS NOTE    Patient Name: Karen Bob  Date of Admission: 5/31/2021  Today's Date: 06/01/21  Length of Stay: 1  Primary Care Physician: Rhea Grider APRN    Subjective   Chief Complaint: SOA  HPI   Feels better, much less " SOA  Afebrile        Review of Systems   Constitutional: Positive for fatigue. Negative for fever.   HENT: Negative for congestion and ear pain.    Eyes: Negative for redness and visual disturbance.   Respiratory: Positive for cough and shortness of breath. Negative for wheezing.    Cardiovascular: Negative for chest pain and palpitations.   Gastrointestinal: Negative for abdominal pain, diarrhea, nausea and vomiting.   Endocrine: Negative for cold intolerance and heat intolerance.   Genitourinary: Negative for dysuria and frequency.   Musculoskeletal: Negative for arthralgias and back pain.   Skin: Negative for rash and wound.   Neurological: Negative for dizziness and headaches.   Psychiatric/Behavioral: Negative for confusion. The patient is not nervous/anxious.         All pertinent negatives and positives are as above. All other systems have been reviewed and are negative unless otherwise stated.     Objective    Temp:  [97.2 °F (36.2 °C)-102.6 °F (39.2 °C)] 98.4 °F (36.9 °C)  Heart Rate:  [] 90  Resp:  [16-45] 16  BP: ()/(56-92) 122/69  Physical Exam  Vitals reviewed.   Constitutional:       Appearance: He is well-developed.   HENT:      Head: Normocephalic and atraumatic.      Right Ear: External ear normal.      Left Ear: External ear normal.      Nose: Nose normal.   Eyes:      General: No scleral icterus.        Right eye: No discharge.         Left eye: No discharge.      Conjunctiva/sclera: Conjunctivae normal.      Pupils: Pupils are equal, round, and reactive to light.   Neck:      Thyroid: No thyromegaly.      Trachea: No tracheal deviation.   Cardiovascular:      Rate and Rhythm: Normal rate and regular rhythm.      Heart sounds: Normal heart sounds. No murmur heard.   No friction rub. No gallop.    Pulmonary:      Effort: Pulmonary effort is normal. No respiratory distress.      Breath sounds: No stridor. Decreased breath sounds and rhonchi present. No wheezing or rales.   Chest:       Chest wall: No tenderness.   Abdominal:      General: Bowel sounds are normal. There is no distension.      Palpations: Abdomen is soft. There is no mass.      Tenderness: There is no abdominal tenderness. There is no guarding or rebound.      Hernia: No hernia is present.   Musculoskeletal:         General: No deformity. Normal range of motion.      Cervical back: Normal range of motion and neck supple.   Lymphadenopathy:      Cervical: No cervical adenopathy.   Skin:     General: Skin is warm and dry.      Coloration: Skin is not pale.      Findings: No erythema or rash.   Neurological:      Mental Status: He is alert and oriented to person, place, and time.      Cranial Nerves: No cranial nerve deficit.      Motor: No abnormal muscle tone.      Coordination: Coordination normal.      Deep Tendon Reflexes: Reflexes are normal and symmetric. Reflexes normal.   Psychiatric:         Behavior: Behavior normal.         Thought Content: Thought content normal.         Judgment: Judgment normal.             Results Review:  I have reviewed the labs, radiology results, and diagnostic studies.    Laboratory Data:   Results from last 7 days   Lab Units 06/01/21  0239 05/31/21  1328   WBC 10*3/mm3 10.01 14.41*   HEMOGLOBIN g/dL 13.3 13.6   HEMATOCRIT % 38.4 38.7   PLATELETS 10*3/mm3 393 367        Results from last 7 days   Lab Units 06/01/21  0239 05/31/21  1327   SODIUM mmol/L 135* 131*   POTASSIUM mmol/L 4.2 3.5   CHLORIDE mmol/L 97* 92*   CO2 mmol/L 29.0 24.0   BUN mg/dL 12 15   CREATININE mg/dL 0.80 1.01   CALCIUM mg/dL 9.1 9.2   BILIRUBIN mg/dL 0.5 0.9   ALK PHOS U/L 85 89   ALT (SGPT) U/L 24 22   AST (SGOT) U/L 40 39   GLUCOSE mg/dL 163* 132*       Culture Data:   No results found for: BLOODCX, URINECX, WOUNDCX, MRSACX, RESPCX, STOOLCX    Radiology Data:   Imaging Results (Last 24 Hours)     Procedure Component Value Units Date/Time    CT Angiogram Chest [[id number]] Collected: 05/31/21 1618     Updated: 05/31/21 1639     Narrative:      CT ANGIOGRAM CHEST- 5/31/2021 3:41 PM CDT      HISTORY: Hypoxemia, elevated D-dimer      COMPARISON: None.      DOSE LENGTH PRODUCT: 3 or 20 mGy cm. Automated exposure control was also  utilized to decrease patient radiation dose.     TECHNIQUE: Helical tomographic images of the chest were obtained after  the administration of intravenous contrast following angiogram protocol.  Additionally, 3D and multiplanar reformatted images were provided.        FINDINGS:       Pulmonary arteries: There is adequate enhancement of the pulmonary  arteries to evaluate for central and segmental pulmonary emboli. There  are no filling defects within the main, lobar, segmental or visualized  subsegmental pulmonary arteries. The pulmonary arteries are within  normal limits for size.      Aorta and great vessels: Thoracic aorta is normal in caliber. No  dissection identified. No flow-limiting stenosis identified at the great  vessel origins.     Visualized neck base: The imaged portion of the base of the neck appears  unremarkable.      Lungs: Essentially diffuse bilateral pulmonary opacities. Septal  thickening is identified as well as extensive groundglass opacification,  and developing consolidation in the more dependent lung fields,  especially in the right upper and right lower lobes. There is also a  trace pleural effusion on the right. No peribronchial thickening is  seen. No endobronchial lesion. No pneumothorax. No cyst formation.     Heart: The heart is normal in size. There is no pericardial effusion..     Mediastinum and lymph nodes: No suspicious hilar or mediastinal  adenopathy..     Skeletal and soft tissues: Chest wall soft tissues are unremarkable. No  acute bony abnormality. Thoracic spine degenerative change..      Upper abdomen: The imaged portion of the upper abdomen demonstrates no  acute process.        Impression:      1. No evidence of pulmonary embolus.  2. Bilateral, nearly diffuse  pulmonary opacities with septal thickening,  groundglass attenuation and developing consolidation in the dependent  portions. This seems most likely ARDS, secondary to atypical pulmonary  infection, sepsis or drug or chemical exposure.        This report was finalized on 05/31/2021 16:36 by Dr Manjit Bermudez, .          I have reviewed the patient's current medications.     Assessment/Plan     Active Hospital Problems    Diagnosis    • ARDS (adult respiratory distress syndrome) (CMS/[facility])      Labs reviewed: hyperglycemia likely due to steroids    Telemetry reviewed    Telemetry independently interpreted by me: NSR    1.  Vape induced lung injury  -IV Steroids  -nebs  -consult pulm     2.  ?Pneumonia  -IV abx  -Blood/sputum cultures  -check Atypicals     3.  Acute Hypoxic respiratory failure  -IV abx  -IV steroids  -nebs  -consult pulm  -supplemental oxygen, wean as tolerated    4.  Hyperglycemia  -likely due to steroids  -monitor            Discharge Planning: I expect the patient to be discharged to home in 3-4 days      Electronically signed by Morgan Beckham MD, 06/01/21, 09:18 CDT.      Electronically signed by Morgan Beckham MD at 06/01/21 0920

## 2021-06-01 NOTE — PROGRESS NOTES
[facility] Medicine Services  INPATIENT PROGRESS NOTE    Patient Name: Adi Dougherty  Date of Admission: 5/31/2021  Today's Date: 06/01/21  Length of Stay: 1  Primary Care Physician: Rhea Grider APRN    Subjective   Chief Complaint: SOA  HPI   Feels better, much less SOA  Afebrile        Review of Systems   Constitutional: Positive for fatigue. Negative for fever.   HENT: Negative for congestion and ear pain.    Eyes: Negative for redness and visual disturbance.   Respiratory: Positive for cough and shortness of breath. Negative for wheezing.    Cardiovascular: Negative for chest pain and palpitations.   Gastrointestinal: Negative for abdominal pain, diarrhea, nausea and vomiting.   Endocrine: Negative for cold intolerance and heat intolerance.   Genitourinary: Negative for dysuria and frequency.   Musculoskeletal: Negative for arthralgias and back pain.   Skin: Negative for rash and wound.   Neurological: Negative for dizziness and headaches.   Psychiatric/Behavioral: Negative for confusion. The patient is not nervous/anxious.         All pertinent negatives and positives are as above. All other systems have been reviewed and are negative unless otherwise stated.     Objective    Temp:  [97.2 °F (36.2 °C)-102.6 °F (39.2 °C)] 98.4 °F (36.9 °C)  Heart Rate:  [] 90  Resp:  [16-45] 16  BP: ()/(56-92) 122/69  Physical Exam  Vitals reviewed.   Constitutional:       Appearance: He is well-developed.   HENT:      Head: Normocephalic and atraumatic.      Right Ear: External ear normal.      Left Ear: External ear normal.      Nose: Nose normal.   Eyes:      General: No scleral icterus.        Right eye: No discharge.         Left eye: No discharge.      Conjunctiva/sclera: Conjunctivae normal.      Pupils: Pupils are equal, round, and reactive to light.   Neck:      Thyroid: No thyromegaly.      Trachea: No tracheal deviation.   Cardiovascular:      Rate and Rhythm:  Normal rate and regular rhythm.      Heart sounds: Normal heart sounds. No murmur heard.   No friction rub. No gallop.    Pulmonary:      Effort: Pulmonary effort is normal. No respiratory distress.      Breath sounds: No stridor. Decreased breath sounds and rhonchi present. No wheezing or rales.   Chest:      Chest wall: No tenderness.   Abdominal:      General: Bowel sounds are normal. There is no distension.      Palpations: Abdomen is soft. There is no mass.      Tenderness: There is no abdominal tenderness. There is no guarding or rebound.      Hernia: No hernia is present.   Musculoskeletal:         General: No deformity. Normal range of motion.      Cervical back: Normal range of motion and neck supple.   Lymphadenopathy:      Cervical: No cervical adenopathy.   Skin:     General: Skin is warm and dry.      Coloration: Skin is not pale.      Findings: No erythema or rash.   Neurological:      Mental Status: He is alert and oriented to person, place, and time.      Cranial Nerves: No cranial nerve deficit.      Motor: No abnormal muscle tone.      Coordination: Coordination normal.      Deep Tendon Reflexes: Reflexes are normal and symmetric. Reflexes normal.   Psychiatric:         Behavior: Behavior normal.         Thought Content: Thought content normal.         Judgment: Judgment normal.             Results Review:  I have reviewed the labs, radiology results, and diagnostic studies.    Laboratory Data:   Results from last 7 days   Lab Units 06/01/21  0239 05/31/21  1328   WBC 10*3/mm3 10.01 14.41*   HEMOGLOBIN g/dL 13.3 13.6   HEMATOCRIT % 38.4 38.7   PLATELETS 10*3/mm3 393 367        Results from last 7 days   Lab Units 06/01/21  0239 05/31/21  1327   SODIUM mmol/L 135* 131*   POTASSIUM mmol/L 4.2 3.5   CHLORIDE mmol/L 97* 92*   CO2 mmol/L 29.0 24.0   BUN mg/dL 12 15   CREATININE mg/dL 0.80 1.01   CALCIUM mg/dL 9.1 9.2   BILIRUBIN mg/dL 0.5 0.9   ALK PHOS U/L 85 89   ALT (SGPT) U/L 24 22   AST (SGOT) U/L  40 39   GLUCOSE mg/dL 163* 132*       Culture Data:   No results found for: BLOODCX, URINECX, WOUNDCX, MRSACX, RESPCX, STOOLCX    Radiology Data:   Imaging Results (Last 24 Hours)     Procedure Component Value Units Date/Time    CT Angiogram Chest [[id number]] Collected: 05/31/21 1618     Updated: 05/31/21 1639    Narrative:      CT ANGIOGRAM CHEST- 5/31/2021 3:41 PM CDT      HISTORY: Hypoxemia, elevated D-dimer      COMPARISON: None.      DOSE LENGTH PRODUCT: 3 or 20 mGy cm. Automated exposure control was also  utilized to decrease patient radiation dose.     TECHNIQUE: Helical tomographic images of the chest were obtained after  the administration of intravenous contrast following angiogram protocol.  Additionally, 3D and multiplanar reformatted images were provided.        FINDINGS:       Pulmonary arteries: There is adequate enhancement of the pulmonary  arteries to evaluate for central and segmental pulmonary emboli. There  are no filling defects within the main, lobar, segmental or visualized  subsegmental pulmonary arteries. The pulmonary arteries are within  normal limits for size.      Aorta and great vessels: Thoracic aorta is normal in caliber. No  dissection identified. No flow-limiting stenosis identified at the great  vessel origins.     Visualized neck base: The imaged portion of the base of the neck appears  unremarkable.      Lungs: Essentially diffuse bilateral pulmonary opacities. Septal  thickening is identified as well as extensive groundglass opacification,  and developing consolidation in the more dependent lung fields,  especially in the right upper and right lower lobes. There is also a  trace pleural effusion on the right. No peribronchial thickening is  seen. No endobronchial lesion. No pneumothorax. No cyst formation.     Heart: The heart is normal in size. There is no pericardial effusion..     Mediastinum and lymph nodes: No suspicious hilar or mediastinal  adenopathy..     Skeletal and  soft tissues: Chest wall soft tissues are unremarkable. No  acute bony abnormality. Thoracic spine degenerative change..      Upper abdomen: The imaged portion of the upper abdomen demonstrates no  acute process.        Impression:      1. No evidence of pulmonary embolus.  2. Bilateral, nearly diffuse pulmonary opacities with septal thickening,  groundglass attenuation and developing consolidation in the dependent  portions. This seems most likely ARDS, secondary to atypical pulmonary  infection, sepsis or drug or chemical exposure.        This report was finalized on 05/31/2021 16:36 by Dr Manjit Bermudez, .          I have reviewed the patient's current medications.     Assessment/Plan     Active Hospital Problems    Diagnosis    • ARDS (adult respiratory distress syndrome) (CMS/[facility])      Labs reviewed: hyperglycemia likely due to steroids    Telemetry reviewed    Telemetry independently interpreted by me: NSR    1.  Vape induced lung injury  -IV Steroids  -nebs  -consult pulm     2.  ?Pneumonia  -IV abx  -Blood/sputum cultures  -check Atypicals     3.  Acute Hypoxic respiratory failure  -IV abx  -IV steroids  -nebs  -consult pulm  -supplemental oxygen, wean as tolerated    4.  Hyperglycemia  -likely due to steroids  -monitor            Discharge Planning: I expect the patient to be discharged to home in 3-4 days      Electronically signed by Moragn Beckham MD, 06/01/21, 09:18 CDT.

## 2021-06-01 NOTE — CASE MANAGEMENT/SOCIAL WORK
Discharge Planning Assessment   Amelia     Patient Name: Adi Dougherty  MRN: [id number]  Today's Date: 6/1/2021    Admit Date: 5/31/2021    Discharge Needs Assessment     Row Name 06/01/21 1153       Living Environment    Lives With  grandparent(s)    Name(s) of Who Lives With Patient  Grandmother - Rena Mansfield    Current Living Arrangements  home/apartment/condo    Primary Care Provided by  self    Provides Primary Care For  no one    Family Caregiver if Needed  grandparent(s)    Quality of Family Relationships  supportive;helpful;involved    Able to Return to Prior Arrangements  yes       Resource/Environmental Concerns    Resource/Environmental Concerns  none       Transition Planning    Patient/Family Anticipates Transition to  home with family    Patient/Family Anticipated Services at Transition  none    Transportation Anticipated  family or friend will provide       Discharge Needs Assessment    Readmission Within the Last 30 Days  no previous admission in last 30 days    Equipment Currently Used at Home  none    Concerns to be Addressed  no discharge needs identified;denies needs/concerns at this time    Anticipated Changes Related to Illness  none    Equipment Needed After Discharge  none    Discharge Coordination/Progress  SW spoke with patient regarding discharge plan/needs.  Patient states he resides at home with his grandmother, functions independently, has a PCP and RX coverage.  Patient plans to return home upon discharge and denies needs.        Discharge Plan    No documentation.       Continued Care and Services - Admitted Since 5/31/2021    Coordination has not been started for this encounter.         Demographic Summary    No documentation.       Functional Status    No documentation.       Psychosocial    No documentation.       Abuse/Neglect    No documentation.       Legal    No documentation.       Substance Abuse    No documentation.       Patient Forms    No documentation.            CHULA ChristianW

## 2021-06-01 NOTE — CONSULTS
PULMONARY & CRITICAL CARE CONSULT - [facility]    21, 09:53 CDT  Patient Care Team:  Rhea Grider APRN as PCP - General (Family Medicine)  Name: Adi Dougherty  : 2000  MRN: [id number]  Contact Serial Number [id number]    Chief complaint: Shortness of breath  HPI:  We have been consulted by Morgan Beckham MD to see this 21 y.o. male born on 2000.  Patient complains of dyspnea in the chest for 3 days. Severity: severe and worsening.  Aggravating factors: walking.   Alleviating factors: medication(s) (oxygen) Associated symptoms: nausea. Sputum is scant.  Patient currently is not on home oxygen therapy.. Respiratory history: no history of pneumonia or bronchitis he has been utilizing vaporized THC recreationally prior to his acute symptoms.  Currently he is in very good spirits and says he feels pretty good except for being short of breath.  Past Medical History:   has a past medical history of Allergic.  Surgical history negative  No Known Allergies  Medications:  azithromycin, 500 mg, Intravenous, Q24H  cefTRIAXone, 1 g, Intravenous, Q24H  ipratropium-albuterol, 3 mL, Nebulization, 4x Daily - RT  methylPREDNISolone sodium succinate, 80 mg, Intravenous, Q8H         Family History:  No lung disease or respiratory illness  Social History:   reports that he has never smoked. He has never used smokeless tobacco. pt has been using THC containing vapor products.  Review of Systems:  Review of Systems   Constitutional: Negative for appetite change, diaphoresis and fatigue.   HENT: Negative for congestion and trouble swallowing.    Respiratory: Negative for choking, chest tightness, shortness of breath and stridor.    Gastrointestinal: Positive for nausea. Negative for diarrhea and vomiting.   Endocrine: Negative for heat intolerance.   Genitourinary: Negative for hematuria.   Musculoskeletal: Negative for joint swelling.   Neurological: Negative for dizziness, seizures and  syncope.   Hematological: Negative for adenopathy.   Psychiatric/Behavioral: Negative for agitation and decreased concentration. The patient is hyperactive. The patient is not nervous/anxious.       Physical Exam:  Temp:  [97.2 °F (36.2 °C)-102.6 °F (39.2 °C)] 98.4 °F (36.9 °C)  Heart Rate:  [] 90  Resp:  [16-45] 16  BP: ()/(56-92) 122/69    Intake/Output Summary (Last 24 hours) at 6/1/2021 0953  Last data filed at 5/31/2021 1826  Gross per 24 hour   Intake 850 ml   Output --   Net 850 ml         05/31/21  1258   Weight: 85.3 kg (188 lb)     SpO2 Percentage    06/01/21 0424 06/01/21 0500 06/01/21 0600   SpO2: 92% 94% 94%     Physical Exam  Constitutional:       Appearance: Normal appearance. He is ill-appearing. He is not diaphoretic.      Interventions: Nasal cannula in place.   HENT:      Head: Normocephalic and atraumatic.      Jaw: No trismus.      Right Ear: External ear normal.      Left Ear: External ear normal.      Nose: Nose normal.      Mouth/Throat:      Mouth: Mucous membranes are moist.      Tongue: Tongue does not deviate from midline.   Eyes:      Extraocular Movements: Extraocular movements intact.   Neck:      Comments: No thyromegaly  Cardiovascular:      Rate and Rhythm: Normal rate and regular rhythm.   Pulmonary:      Effort: Pulmonary effort is normal. No respiratory distress.      Breath sounds: No stridor. Rales present. No wheezing or rhonchi.   Abdominal:      General: Bowel sounds are normal. There is no distension.      Palpations: Abdomen is soft.   Musculoskeletal:         General: No swelling, tenderness or deformity.      Cervical back: Normal range of motion.      Right lower leg: No edema.      Left lower leg: No edema.   Skin:     Findings: No erythema or rash.   Neurological:      General: No focal deficit present.      Mental Status: He is alert.      Cranial Nerves: No cranial nerve deficit.   Psychiatric:         Mood and Affect: Mood normal.         Speech: Speech  normal.       Results from last 7 days   Lab Units 06/01/21  0239 05/31/21  1328   WBC 10*3/mm3 10.01 14.41*   HEMOGLOBIN g/dL 13.3 13.6   PLATELETS 10*3/mm3 393 367     Results from last 7 days   Lab Units 06/01/21  0239 05/31/21  1327   SODIUM mmol/L 135* 131*   POTASSIUM mmol/L 4.2 3.5   CO2 mmol/L 29.0 24.0   BUN mg/dL 12 15   CREATININE mg/dL 0.80 1.01   GLUCOSE mg/dL 163* 132*     Results from last 7 days   Lab Units 05/31/21  1348   PH, ARTERIAL pH units 7.471*   PCO2, ARTERIAL mm Hg 37.4   PO2 ART mm Hg 50.2*     Recent radiology:   Imaging Results (Last 72 Hours)     Procedure Component Value Units Date/Time    CT Angiogram Chest [[id number]] Collected: 05/31/21 1618     Updated: 05/31/21 1639    Narrative:      CT ANGIOGRAM CHEST- 5/31/2021 3:41 PM CDT      HISTORY: Hypoxemia, elevated D-dimer      COMPARISON: None.      DOSE LENGTH PRODUCT: 3 or 20 mGy cm. Automated exposure control was also  utilized to decrease patient radiation dose.     TECHNIQUE: Helical tomographic images of the chest were obtained after  the administration of intravenous contrast following angiogram protocol.  Additionally, 3D and multiplanar reformatted images were provided.        FINDINGS:       Pulmonary arteries: There is adequate enhancement of the pulmonary  arteries to evaluate for central and segmental pulmonary emboli. There  are no filling defects within the main, lobar, segmental or visualized  subsegmental pulmonary arteries. The pulmonary arteries are within  normal limits for size.      Aorta and great vessels: Thoracic aorta is normal in caliber. No  dissection identified. No flow-limiting stenosis identified at the great  vessel origins.     Visualized neck base: The imaged portion of the base of the neck appears  unremarkable.      Lungs: Essentially diffuse bilateral pulmonary opacities. Septal  thickening is identified as well as extensive groundglass opacification,  and developing consolidation in the more  dependent lung fields,  especially in the right upper and right lower lobes. There is also a  trace pleural effusion on the right. No peribronchial thickening is  seen. No endobronchial lesion. No pneumothorax. No cyst formation.     Heart: The heart is normal in size. There is no pericardial effusion..     Mediastinum and lymph nodes: No suspicious hilar or mediastinal  adenopathy..     Skeletal and soft tissues: Chest wall soft tissues are unremarkable. No  acute bony abnormality. Thoracic spine degenerative change..      Upper abdomen: The imaged portion of the upper abdomen demonstrates no  acute process.        Impression:      1. No evidence of pulmonary embolus.  2. Bilateral, nearly diffuse pulmonary opacities with septal thickening,  groundglass attenuation and developing consolidation in the dependent  portions. This seems most likely ARDS, secondary to atypical pulmonary  infection, sepsis or drug or chemical exposure.        This report was finalized on 05/31/2021 16:36 by Dr Manjit Bermudez, .    XR Chest 1 View [[id number]] Collected: 05/31/21 1326     Updated: 05/31/21 1337    Narrative:      Frontal upright radiograph of the chest 5/31/2021 1:16 PM CDT     HISTORY: Fever, hypoxia     COMPARISON: None.     FINDINGS:      Bilateral diffuse pulmonary infiltrates with a diffuse hazy appearance  of both lungs. No dense consolidation identified. No pneumothorax or  pleural effusion. Heart size is normal. The pulmonary vasculature are  nondilated.       Impression:      1. Bilateral diffuse hazy pulmonary opacities concerning for atypical  pneumonia. Differential would include acute eosinophilic pneumonia,  acute hypersensitivity pneumonitis and perhaps pneumocystis pneumonia.     This report was finalized on 05/31/2021 13:34 by Dr Manjit Bermudez, .        My radiograph interpretation/independent review of imaging: Chest x-ray and CT scan both are reviewed.  These showed diffuse groundglass appearing densities  evenly distributed through both lungs.  Other test results (not lab or imaging):  None available  Problem List as identified by Epic (may contain historical, inactive problems)  Patient Active Problem List   Diagnosis   • ARDS (adult respiratory distress syndrome) (CMS/[facility])     Pulmonary Assessment:  New problem (to me), with additional workup planned: Acute respiratory failure with hypoxia due to EVALI  New problem (to me), no additional workup planned: Mild hyperglycemia while receiving steroid therapy.  Defer any needed work-up of that to others.  No history of diabetes.  Suspect this is medication related.  Other problems either stable, failing to improve or worsenin. Mild hyponatremia improved  2. Recreational use of THC-containing vapor products, complicated by EVALI  3. Mild respiratory alkalosis likely related to accelerated respiratory drive in the setting of hypoxia    Recommend/plan:   · Continue steroids.  These have been shown in some studies to facilitate resolution of EVALI, although primary management is supportive  · Continue oxygen and taper as tolerated  · Continue antibiotics, presumptively.  However this presentation is all most likely related to EVALI  · Follow-up chest x-ray    Thank you for this consult.  We will follow along.  Electronically signed by Ceferino Webb MD, 21, 9:53 AM CDT.

## 2021-06-01 NOTE — PAYOR COMM NOTE
"Karen Bob (21 y.o. Male) [id number]  New inpt admit to ICU   ADMIT 5/31  [facility] phone     Fax        Date of Birth Social Security Number Address Home Phone MRN    2000  [address] [place] [id number] [phone] [id number]    Confucianism Marital Status          Other Single       Admission Date Admission Type Admitting Provider Attending Provider Department, Room/Bed    5/31/21 Emergency Morgan Beckham MD Moore, Jonathan Scott, MD [facility] INTENSIVE CARE, I004/1    Discharge Date Discharge Disposition Discharge Destination                       Attending Provider: Morgan Beckham MD    Allergies: No Known Allergies    Isolation: None   Infection: None   Code Status: CPR    Ht: 182.9 cm (72\")   Wt: 85.3 kg (188 lb)    Admission Cmt: None   Principal Problem: None                Active Insurance as of 5/31/2021     Primary Coverage     Payor Plan Insurance Group Employer/Plan Group    [employer] [place] [employer] [place]ICA[place]      Payor Plan Address Payor Plan Phone Number Payor Plan Fax Number Effective Dates    [address] [address] [phone]  4/12/2018 - None Entered    [facility] [id number]       Subscriber Name Subscriber Birth Date Member ID       KAREN BOB 2000 [id number]                 Emergency Contacts      (Rel.) Home Phone Work Phone Mobile Phone    Rena Mansfield (Grandparent) [phone] -- --               Emergency Department Notes      Mic Reyes MD at 05/31/21 1713          Subjective   This patient is a 21-year-old gentleman with no significant past medical history.  Of note, he does vape he says every other day and it takes him a week to finish a \"pack\".  He saw his primary care physician last week because of the onset of symptoms of fever, malaise and cough with shortness of breath.  Since 4 or 5 days ago the symptoms of progressively gotten worse to the point today " where he finds it very difficult to breathe easily, feels extremely short of breath, has alternating sweats and chills and generalized malaise and myalgias.          Review of Systems   Constitutional: Positive for chills and fever.   Respiratory: Positive for cough and shortness of breath.    All other systems reviewed and are negative.      Past Medical History:   Diagnosis Date   • Allergic        No Known Allergies    No past surgical history on file.    No family history on file.    Social History     Socioeconomic History   • Marital status: Single     Spouse name: Not on file   • Number of children: Not on file   • Years of education: Not on file   • Highest education level: Not on file   Tobacco Use   • Smoking status: Never Smoker   • Smokeless tobacco: Never Used           Objective   Physical Exam  Vitals and nursing note reviewed.   Constitutional:       General: He is in acute distress.      Appearance: He is well-developed. He is ill-appearing, toxic-appearing and diaphoretic.   HENT:      Head: Normocephalic and atraumatic.      Right Ear: External ear normal.      Left Ear: External ear normal.      Nose: Nose normal.   Eyes:      Conjunctiva/sclera: Conjunctivae normal.   Cardiovascular:      Rate and Rhythm: Regular rhythm. Tachycardia present.      Heart sounds: Normal heart sounds.   Pulmonary:      Effort: Tachypnea and respiratory distress present.      Breath sounds: Examination of the right-upper field reveals rales. Examination of the left-upper field reveals rales. Examination of the right-middle field reveals rales. Examination of the left-middle field reveals rales. Examination of the right-lower field reveals rales. Examination of the left-lower field reveals rales. Rales present.   Abdominal:      General: Bowel sounds are normal.      Palpations: Abdomen is soft.   Musculoskeletal:         General: Normal range of motion.      Cervical back: Normal range of motion and neck supple.    Skin:     General: Skin is warm.      Capillary Refill: Capillary refill takes less than 2 seconds.   Neurological:      Mental Status: He is alert and oriented to person, place, and time.   Psychiatric:         Behavior: Behavior normal.         Thought Content: Thought content normal.         Judgment: Judgment normal.         Procedures          ED Course                                           MDM  Number of Diagnoses or Management Options     Amount and/or Complexity of Data Reviewed  Clinical lab tests: reviewed and ordered  Tests in the radiology section of CPT®: reviewed and ordered    Patient Progress  Patient progress: stable      Final diagnoses:   ARDS (adult respiratory distress syndrome) (CMS/[facility])       ED Disposition  ED Disposition     ED Disposition Condition Comment    Decision to Admit  Level of Care: Critical Care [6]   Diagnosis: ARDS (adult respiratory distress syndrome) (CMS/[facility]) [668574]   Admitting Physician: DORENE SHEIKH [[id number]]   Attending Physician: DORENE SHEIKH [[id number]]   Certification: I Certify That Inpatient Hospital Services Are Medically Necessary For Greater Than 2 Midnights            No follow-up provider specified.       Medication List      No changes were made to your prescriptions during this visit.       The patient's work-up revealed a chest CT that demonstrated probable ARDS from either an atypical pneumonia or a chemical pneumonitis.  This may be a consequence of his habit of vaping.  I discussed case with Dr. Webb who would be delighted to consult on this patient and with Dr. Sheikh who kindly agreed to admit the patient under his care.  We all agree that the patient deserves intensive care unit placement.  The patient is much improved on the flow oxygen, is saturating at 95% now and much more comfortable and able to speak in full sentences.     Mic Reyes MD  05/31/21 1736      Electronically signed by Mic Reyes MD at 05/31/21  0144       Vital Signs (last day)     Date/Time   Temp   Temp src   Pulse   Resp   BP   Patient Position   SpO2    06/01/21 0714   --   --   --   16   --   --   --    06/01/21 0709   --   --   90   24   --   --   --    06/01/21 0700   --   --   87   (!) 36   --   --   --    06/01/21 0600   --   --   80   (!) 42   122/69   --   94    06/01/21 0500   --   --   81   (!) 29   106/75   --   94    06/01/21 0424   --   --   86   --   --   --   92    06/01/21 0400   --   --   77   27   118/66   --   90    06/01/21 0345   98.7 (37.1)   Oral   --   --   --   --   --    06/01/21 0300   --   --   85   27   122/67   --   92    06/01/21 0200   --   --   80   22   95/56   --   92    06/01/21 0100   --   --   90   (!) 45   119/60   --   92    06/01/21 0000   --   --   88   27   123/75   --   93    05/31/21 2330   99.6 (37.6)   Axillary   --   --   --   --   --    05/31/21 2315   --   --   97   24   127/78   --   94    05/31/21 2300   --   --   96   18   122/77   --   90    05/31/21 2200   (!) 101.1 (38.4)   Axillary   106   26   123/71   --   90    05/31/21 2100   --   --   113   23   124/65   --   90    05/31/21 2002   --   --   115   24   --   --   92    05/31/21 2000   (!) 102.6 (39.2)   Axillary   116   24   128/69   --   --    05/31/21 1830   97.2 (36.2)   --   106   26   132/81   --   94    05/31/21 1815   --   --   116   --   --   --   90    05/31/21 1800   --   --   104   --   --   --   95    05/31/21 1745   --   --   105   --   --   --   95    05/31/21 1730   --   --   101   --   --   --   95    05/31/21 1715   --   --   102   --   --   --   98    05/31/21 1700   --   --   99   --   --   --   96    05/31/21 1645   --   --   101   --   --   --   96    05/31/21 1630   --   --   99   --   --   --   95    05/31/21 1615   --   --   97   --   --   --   96 05/31/21 1600   --   --   102   --   --   --   94    05/31/21 1530   --   --   99   --   123/92   --   96    05/31/21 1258   100.5 (38.1)   --   (!) 128   (!) 33   150/56    --   (!) 81                Current Facility-Administered Medications   Medication Dose Route Frequency Provider Last Rate Last Admin   • acetaminophen (TYLENOL) tablet 650 mg  650 mg Oral Q6H PRN AravindPuneet Lala,    650 mg at 05/31/21 2014   • AZITHROMYCIN 500 MG/250 ML 0.9% NS IVPB (vial-mate)  500 mg Intravenous Q24H Morgan Beckham MD       • cefTRIAXone (ROCEPHIN) 1 g/100 mL 0.9% NS (MBP)  1 g Intravenous Q24H Morgan Beckham MD       • ipratropium-albuterol (DUO-NEB) nebulizer solution 3 mL  3 mL Nebulization 4x Daily - RT Morgan Beckham MD   3 mL at 06/01/21 0708   • ketorolac (TORADOL) injection 30 mg  30 mg Intravenous Q6H PRN Morgan Beckham MD       • methylPREDNISolone sodium succinate (SOLU-Medrol) injection 80 mg  80 mg Intravenous Q8H Morgan Beckham MD   80 mg at 06/01/21 0345   • ondansetron (ZOFRAN) injection 4 mg  4 mg Intravenous Q6H PRN Morgan Beckham MD           Imaging Results (Last 24 Hours)     Procedure Component Value Units Date/Time    CT Angiogram Chest [[id number]] Collected: 05/31/21 1618     Updated: 05/31/21 1639    Narrative:      CT ANGIOGRAM CHEST- 5/31/2021 3:41 PM CDT      HISTORY: Hypoxemia, elevated D-dimer      COMPARISON: None.      DOSE LENGTH PRODUCT: 3 or 20 mGy cm. Automated exposure control was also  utilized to decrease patient radiation dose.     TECHNIQUE: Helical tomographic images of the chest were obtained after  the administration of intravenous contrast following angiogram protocol.  Additionally, 3D and multiplanar reformatted images were provided.        FINDINGS:       Pulmonary arteries: There is adequate enhancement of the pulmonary  arteries to evaluate for central and segmental pulmonary emboli. There  are no filling defects within the main, lobar, segmental or visualized  subsegmental pulmonary arteries. The pulmonary arteries are within  normal limits for size.      Aorta and great vessels: Thoracic aorta  is normal in caliber. No  dissection identified. No flow-limiting stenosis identified at the great  vessel origins.     Visualized neck base: The imaged portion of the base of the neck appears  unremarkable.      Lungs: Essentially diffuse bilateral pulmonary opacities. Septal  thickening is identified as well as extensive groundglass opacification,  and developing consolidation in the more dependent lung fields,  especially in the right upper and right lower lobes. There is also a  trace pleural effusion on the right. No peribronchial thickening is  seen. No endobronchial lesion. No pneumothorax. No cyst formation.     Heart: The heart is normal in size. There is no pericardial effusion..     Mediastinum and lymph nodes: No suspicious hilar or mediastinal  adenopathy..     Skeletal and soft tissues: Chest wall soft tissues are unremarkable. No  acute bony abnormality. Thoracic spine degenerative change..      Upper abdomen: The imaged portion of the upper abdomen demonstrates no  acute process.        Impression:      1. No evidence of pulmonary embolus.  2. Bilateral, nearly diffuse pulmonary opacities with septal thickening,  groundglass attenuation and developing consolidation in the dependent  portions. This seems most likely ARDS, secondary to atypical pulmonary  infection, sepsis or drug or chemical exposure.        This report was finalized on 05/31/2021 16:36 by Dr Manjit Bermudez, .    XR Chest 1 View [[id number]] Collected: 05/31/21 1326     Updated: 05/31/21 1337    Narrative:      Frontal upright radiograph of the chest 5/31/2021 1:16 PM CDT     HISTORY: Fever, hypoxia     COMPARISON: None.     FINDINGS:      Bilateral diffuse pulmonary infiltrates with a diffuse hazy appearance  of both lungs. No dense consolidation identified. No pneumothorax or  pleural effusion. Heart size is normal. The pulmonary vasculature are  nondilated.       Impression:      1. Bilateral diffuse hazy pulmonary opacities  concerning for atypical  pneumonia. Differential would include acute eosinophilic pneumonia,  acute hypersensitivity pneumonitis and perhaps pneumocystis pneumonia.     This report was finalized on 05/31/2021 13:34 by Dr Manjit Bermudez, .             po2 arterial   50.2   Wbc 14.41   crp 37.60         bilat hazy pumomary opacities atypical pneumonia      Nurse note : Pt remains A/O x4. NSR 70s-low 100s. Continuing overnight on vapotherm 25L 75% O2. Pt O2 sat has been low to mid 90s with these settings. Pt very tachypnic even when sleeping, RR 19-low 50s. Pt is able to speak in full sentences without difficulty. Voiding and 1 BM. Pt did have 102.6 fever, treated with tylenol and decreased to 98.7. Will continue to monitor.

## 2021-06-01 NOTE — PLAN OF CARE
Pt remains A/O x4. NSR 70s-low 100s. Continuing overnight on vapotherm 25L 75% O2. Pt O2 sat has been low to mid 90s with these settings. Pt very tachypnic even when sleeping, RR 19-low 50s. Pt is able to speak in full sentences without difficulty. Voiding and 1 BM. Pt did have 102.6 fever, treated with tylenol and decreased to 98.7. Will continue to monitor.

## 2021-06-02 LAB
ALBUMIN SERPL-MCNC: 3.3 G/DL (ref 3.5–5.2)
ALBUMIN/GLOB SERPL: 0.8 G/DL
ALP SERPL-CCNC: 84 U/L (ref 39–117)
ALT SERPL W P-5'-P-CCNC: 51 U/L (ref 1–41)
ANION GAP SERPL CALCULATED.3IONS-SCNC: 12 MMOL/L (ref 5–15)
AST SERPL-CCNC: 60 U/L (ref 1–40)
BASOPHILS # BLD AUTO: 0.03 10*3/MM3 (ref 0–0.2)
BASOPHILS NFR BLD AUTO: 0.2 % (ref 0–1.5)
BILIRUB SERPL-MCNC: 0.3 MG/DL (ref 0–1.2)
BUN SERPL-MCNC: 13 MG/DL (ref 6–20)
BUN/CREAT SERPL: 19.1 (ref 7–25)
CALCIUM SPEC-SCNC: 9 MG/DL (ref 8.6–10.5)
CHLORIDE SERPL-SCNC: 98 MMOL/L (ref 98–107)
CO2 SERPL-SCNC: 30 MMOL/L (ref 22–29)
CREAT SERPL-MCNC: 0.68 MG/DL (ref 0.76–1.27)
DEPRECATED RDW RBC AUTO: 36.4 FL (ref 37–54)
EOSINOPHIL # BLD AUTO: 0 10*3/MM3 (ref 0–0.4)
EOSINOPHIL NFR BLD AUTO: 0 % (ref 0.3–6.2)
ERYTHROCYTE [DISTWIDTH] IN BLOOD BY AUTOMATED COUNT: 11.9 % (ref 12.3–15.4)
GFR SERPL CREATININE-BSD FRML MDRD: 149 ML/MIN/1.73
GLOBULIN UR ELPH-MCNC: 3.9 GM/DL
GLUCOSE SERPL-MCNC: 146 MG/DL (ref 65–99)
HCT VFR BLD AUTO: 39.1 % (ref 37.5–51)
HGB BLD-MCNC: 13.6 G/DL (ref 13–17.7)
IMM GRANULOCYTES # BLD AUTO: 0.17 10*3/MM3 (ref 0–0.05)
IMM GRANULOCYTES NFR BLD AUTO: 0.9 % (ref 0–0.5)
LYMPHOCYTES # BLD AUTO: 1.25 10*3/MM3 (ref 0.7–3.1)
LYMPHOCYTES NFR BLD AUTO: 6.9 % (ref 19.6–45.3)
MCH RBC QN AUTO: 29.4 PG (ref 26.6–33)
MCHC RBC AUTO-ENTMCNC: 34.8 G/DL (ref 31.5–35.7)
MCV RBC AUTO: 84.4 FL (ref 79–97)
MONOCYTES # BLD AUTO: 0.54 10*3/MM3 (ref 0.1–0.9)
MONOCYTES NFR BLD AUTO: 3 % (ref 5–12)
NEUTROPHILS NFR BLD AUTO: 16 10*3/MM3 (ref 1.7–7)
NEUTROPHILS NFR BLD AUTO: 89 % (ref 42.7–76)
NRBC BLD AUTO-RTO: 0 /100 WBC (ref 0–0.2)
PLATELET # BLD AUTO: 516 10*3/MM3 (ref 140–450)
PMV BLD AUTO: 10 FL (ref 6–12)
POTASSIUM SERPL-SCNC: 3.6 MMOL/L (ref 3.5–5.2)
PROT SERPL-MCNC: 7.2 G/DL (ref 6–8.5)
RBC # BLD AUTO: 4.63 10*6/MM3 (ref 4.14–5.8)
SODIUM SERPL-SCNC: 140 MMOL/L (ref 136–145)
WBC # BLD AUTO: 17.99 10*3/MM3 (ref 3.4–10.8)

## 2021-06-02 PROCEDURE — 99233 SBSQ HOSP IP/OBS HIGH 50: CPT | Performed by: INTERNAL MEDICINE

## 2021-06-02 PROCEDURE — 25010000002 AZITHROMYCIN PER 500 MG: Performed by: FAMILY MEDICINE

## 2021-06-02 PROCEDURE — 80053 COMPREHEN METABOLIC PANEL: CPT | Performed by: FAMILY MEDICINE

## 2021-06-02 PROCEDURE — 25010000002 METHYLPREDNISOLONE PER 40 MG: Performed by: INTERNAL MEDICINE

## 2021-06-02 PROCEDURE — 94799 UNLISTED PULMONARY SVC/PX: CPT

## 2021-06-02 PROCEDURE — 25010000002 CEFTRIAXONE PER 250 MG: Performed by: FAMILY MEDICINE

## 2021-06-02 PROCEDURE — 63710000001 PREDNISONE PER 1 MG: Performed by: NURSE PRACTITIONER

## 2021-06-02 PROCEDURE — 85025 COMPLETE CBC W/AUTO DIFF WBC: CPT | Performed by: FAMILY MEDICINE

## 2021-06-02 PROCEDURE — 36415 COLL VENOUS BLD VENIPUNCTURE: CPT | Performed by: FAMILY MEDICINE

## 2021-06-02 RX ORDER — PREDNISONE 20 MG/1
20 TABLET ORAL
Status: DISCONTINUED | OUTPATIENT
Start: 2021-06-02 | End: 2021-06-04 | Stop reason: HOSPADM

## 2021-06-02 RX ORDER — PREDNISONE 20 MG/1
20 TABLET ORAL 2 TIMES DAILY WITH MEALS
Status: DISCONTINUED | OUTPATIENT
Start: 2021-06-02 | End: 2021-06-02

## 2021-06-02 RX ADMIN — IPRATROPIUM BROMIDE AND ALBUTEROL SULFATE 3 ML: 2.5; .5 SOLUTION RESPIRATORY (INHALATION) at 14:05

## 2021-06-02 RX ADMIN — IPRATROPIUM BROMIDE AND ALBUTEROL SULFATE 3 ML: 2.5; .5 SOLUTION RESPIRATORY (INHALATION) at 10:07

## 2021-06-02 RX ADMIN — IPRATROPIUM BROMIDE AND ALBUTEROL SULFATE 3 ML: 2.5; .5 SOLUTION RESPIRATORY (INHALATION) at 06:14

## 2021-06-02 RX ADMIN — AZITHROMYCIN DIHYDRATE 500 MG: 500 INJECTION, POWDER, LYOPHILIZED, FOR SOLUTION INTRAVENOUS at 16:32

## 2021-06-02 RX ADMIN — IPRATROPIUM BROMIDE AND ALBUTEROL SULFATE 3 ML: 2.5; .5 SOLUTION RESPIRATORY (INHALATION) at 18:58

## 2021-06-02 RX ADMIN — METHYLPREDNISOLONE SODIUM SUCCINATE 40 MG: 40 INJECTION, POWDER, LYOPHILIZED, FOR SOLUTION INTRAMUSCULAR; INTRAVENOUS at 04:33

## 2021-06-02 RX ADMIN — PREDNISONE 20 MG: 20 TABLET ORAL at 11:41

## 2021-06-02 RX ADMIN — PREDNISONE 20 MG: 20 TABLET ORAL at 17:33

## 2021-06-02 RX ADMIN — CEFTRIAXONE SODIUM 1 G: 1 INJECTION, POWDER, FOR SOLUTION INTRAMUSCULAR; INTRAVENOUS at 16:33

## 2021-06-02 NOTE — PROGRESS NOTES
PULMONARY AND CRITICAL CARE PROGRESS NOTE - [facility]    Patient: Adi Sledd  2000   MR# [id number]   Acct# [id number]  06/02/21   10:53 CDT  Referring Provider: Morgan Beckham MD    Chief Complaint: Shortness of breath with hypoxia  Interval history: He remains on Vapotherm this morning 25 L 40% with a sat of 94 to 95%.  According to BETY Samuel he desats down to the upper 80s with ambulation.  Adi reports that he is overall feeling better and tolerating his treatment regimen well.  No overnight events reported.    Meds:  azithromycin, 500 mg, Intravenous, Q24H  cefTRIAXone, 1 g, Intravenous, Q24H  ipratropium-albuterol, 3 mL, Nebulization, 4x Daily - RT  methylPREDNISolone sodium succinate, 40 mg, Intravenous, Q12H         Review of Systems:   Review of Systems   Constitutional: Negative.    HENT: Negative.    Respiratory: Positive for shortness of breath. Negative for cough.    Cardiovascular: Negative.    Gastrointestinal: Negative.    Skin: Negative.    Neurological: Negative.    Psychiatric/Behavioral: Negative.      Physical Exam:  SpO2 Percentage    06/02/21 0915 06/02/21 1007 06/02/21 1018   SpO2: 96% 90% 94%     Temp:  [98 °F (36.7 °C)-98.4 °F (36.9 °C)] 98.4 °F (36.9 °C)  Heart Rate:  [] 106  Resp:  [13-36] 24  BP: (100-154)/(57-86) 124/74    Intake/Output Summary (Last 24 hours) at 6/2/2021 1053  Last data filed at 6/2/2021 0000  Gross per 24 hour   Intake 240 ml   Output 550 ml   Net -310 ml     Physical Exam  Vitals and nursing note reviewed.   Constitutional:       Appearance: He is well-developed.      Interventions: Nasal cannula in place.   HENT:      Head: Normocephalic and atraumatic.   Eyes:      Pupils: Pupils are equal, round, and reactive to light.   Cardiovascular:      Rate and Rhythm: Normal rate and regular rhythm.   Pulmonary:      Breath sounds: Rales present.   Abdominal:      General: Bowel sounds are normal. There is no distension.       Palpations: Abdomen is soft.   Musculoskeletal:         General: Normal range of motion.      Cervical back: Normal range of motion and neck supple.   Skin:     General: Skin is warm and dry.   Neurological:      Mental Status: He is alert and oriented to person, place, and time.       Laboratory Data:  Results from last 7 days   Lab Units 06/02/21  0306 06/01/21  0239 05/31/21  1328   WBC 10*3/mm3 17.99* 10.01 14.41*   HEMOGLOBIN g/dL 13.6 13.3 13.6   PLATELETS 10*3/mm3 516* 393 367     Results from last 7 days   Lab Units 06/02/21  0306 06/01/21  0239 05/31/21  1327   SODIUM mmol/L 140 135* 131*   POTASSIUM mmol/L 3.6 4.2 3.5   BUN mg/dL 13 12 15   CREATININE mg/dL 0.68* 0.80 1.01     Results from last 7 days   Lab Units 05/31/21  1348   PH, ARTERIAL pH units 7.471*   PCO2, ARTERIAL mm Hg 37.4   PO2 ART mm Hg 50.2*     Blood Culture   Date Value Ref Range Status   05/31/2021 No growth at 24 hours  Preliminary   05/31/2021 No growth at 24 hours  Preliminary     Recent films:  XR Chest 1 View    Result Date: 5/31/2021  1. Bilateral diffuse hazy pulmonary opacities concerning for atypical pneumonia. Differential would include acute eosinophilic pneumonia, acute hypersensitivity pneumonitis and perhaps pneumocystis pneumonia.  This report was finalized on 05/31/2021 13:34 by Dr Manjit Bermudez, .    CT Angiogram Chest    Result Date: 5/31/2021  1. No evidence of pulmonary embolus. 2. Bilateral, nearly diffuse pulmonary opacities with septal thickening, groundglass attenuation and developing consolidation in the dependent portions. This seems most likely ARDS, secondary to atypical pulmonary infection, sepsis or drug or chemical exposure.   This report was finalized on 05/31/2021 16:36 by Dr Manjit Bermudez, .    Films reviewed personally by me.  My interpretation: No new today  Pulmonary Assessment:  1. Acute respiratory failure with hypoxia due to EVALI  2. Leukocytosis, likely related to steroids  3. Hyperglycemia, also  likely due to steroids  4. Recreational use of THC-containing vapor products    Recommend:   · Continue supplemental oxygen and taper as tolerated to keep O2 sat 92% or greater.  He can likely be transitioned off of Vapotherm this morning to salter high flow.  Discussed with BETY Samuel.  · We will transition from IV Solu-Medrol to oral prednisone 20 mg 3 times daily.  Decrease as tolerated.  · Azithromycin and Rocephin day #2  · Continue nebs  · Mobilize  · Probably okay to go to the floor when okay with attending  · Follow-up chest x-ray prior to discharge.  He will need serial follow-ups after discharge.  · Recommend outpatient PFTs.    Electronically signed by DEMETRA Rogers, 06/02/21, 10:53 CDT       Physician substantive portion:  Patient was without new complaints.  He feels better.  He is still on high flow oxygen.  Exam shows him to be awake and in good spirits.  He is on nasal oxygen.  Breath sounds are diminished.  Affect is bright.  Recommend set up and mobilize a little bit as tolerated, anticipating he will be able to do much due to desaturation.  Taper steroids.  Okay with us to the floor.  Recommend avoiding future vapor products.  Continue broad-spectrum antibiotics for now.  Can switch azithromycin to p.o. after tomorrow's dose.    I have seen and examined patient personally, performing a face-to-face diagnostic evaluation with plan of care reviewed and developed with APRN and nursing staff. I have addended and/or modified the above history of present illness, physical examination, and assessment and plan to reflect my findings and impressions. Essential elements of the care plan were discussed with APRN above.  Agree with findings and assessment/plan as documented above.    Electronically signed by Ceferino Webb MD, on 6/2/2021, 17:00 CDT

## 2021-06-02 NOTE — PROGRESS NOTES
[facility] Medicine Services  INPATIENT PROGRESS NOTE    Patient Name: Adi Dougherty  Date of Admission: 5/31/2021  Today's Date: 06/02/21  Length of Stay: 2  Primary Care Physician: Rhea Grider APRN    Subjective   Chief Complaint: SOA    Feels better, much less SOA.  Down to 7L  Afebrile        Review of Systems   Constitutional: Positive for fatigue and fever.   HENT: Negative for congestion and ear pain.    Eyes: Negative for redness and visual disturbance.   Respiratory: Positive for cough and shortness of breath. Negative for wheezing.    Cardiovascular: Negative for chest pain and palpitations.   Gastrointestinal: Negative for abdominal pain, diarrhea, nausea and vomiting.   Endocrine: Negative for cold intolerance and heat intolerance.   Genitourinary: Negative for dysuria and frequency.   Musculoskeletal: Negative for arthralgias and back pain.   Skin: Negative for rash and wound.   Neurological: Negative for dizziness and headaches.   Psychiatric/Behavioral: Negative for confusion. The patient is not nervous/anxious.         All pertinent negatives and positives are as above. All other systems have been reviewed and are negative unless otherwise stated.     Objective    Temp:  [98 °F (36.7 °C)-98.4 °F (36.9 °C)] 98.4 °F (36.9 °C)  Heart Rate:  [] 106  Resp:  [13-36] 24  BP: (100-154)/(57-86) 124/74  Physical Exam  Vitals reviewed.   Constitutional:       Appearance: He is well-developed.   HENT:      Head: Normocephalic and atraumatic.      Right Ear: External ear normal.      Left Ear: External ear normal.      Nose: Nose normal.   Eyes:      General: No scleral icterus.        Right eye: No discharge.         Left eye: No discharge.      Conjunctiva/sclera: Conjunctivae normal.      Pupils: Pupils are equal, round, and reactive to light.   Neck:      Thyroid: No thyromegaly.      Trachea: No tracheal deviation.   Cardiovascular:      Rate and Rhythm:  Normal rate and regular rhythm.      Heart sounds: Normal heart sounds. No murmur heard.   No friction rub. No gallop.    Pulmonary:      Effort: Pulmonary effort is normal. No respiratory distress.      Breath sounds: No stridor. Decreased breath sounds and rhonchi present. No wheezing or rales.   Chest:      Chest wall: No tenderness.   Abdominal:      General: Bowel sounds are normal. There is no distension.      Palpations: Abdomen is soft. There is no mass.      Tenderness: There is no abdominal tenderness. There is no guarding or rebound.      Hernia: No hernia is present.   Musculoskeletal:         General: No deformity. Normal range of motion.      Cervical back: Normal range of motion and neck supple.   Lymphadenopathy:      Cervical: No cervical adenopathy.   Skin:     General: Skin is warm and dry.      Coloration: Skin is not pale.      Findings: No erythema or rash.   Neurological:      Mental Status: He is alert and oriented to person, place, and time.      Cranial Nerves: No cranial nerve deficit.      Motor: No abnormal muscle tone.      Coordination: Coordination normal.      Deep Tendon Reflexes: Reflexes are normal and symmetric. Reflexes normal.   Psychiatric:         Behavior: Behavior normal.         Thought Content: Thought content normal.         Judgment: Judgment normal.             Results Review:  I have reviewed the labs, radiology results, and diagnostic studies.    Laboratory Data:   Results from last 7 days   Lab Units 06/02/21  0306 06/01/21 0239 05/31/21  1328   WBC 10*3/mm3 17.99* 10.01 14.41*   HEMOGLOBIN g/dL 13.6 13.3 13.6   HEMATOCRIT % 39.1 38.4 38.7   PLATELETS 10*3/mm3 516* 393 367        Results from last 7 days   Lab Units 06/02/21  0306 06/01/21  0239 05/31/21  1327   SODIUM mmol/L 140 135* 131*   POTASSIUM mmol/L 3.6 4.2 3.5   CHLORIDE mmol/L 98 97* 92*   CO2 mmol/L 30.0* 29.0 24.0   BUN mg/dL 13 12 15   CREATININE mg/dL 0.68* 0.80 1.01   CALCIUM mg/dL 9.0 9.1 9.2    BILIRUBIN mg/dL 0.3 0.5 0.9   ALK PHOS U/L 84 85 89   ALT (SGPT) U/L 51* 24 22   AST (SGOT) U/L 60* 40 39   GLUCOSE mg/dL 146* 163* 132*       Culture Data:   No results found for: BLOODCX, URINECX, WOUNDCX, MRSACX, RESPCX, STOOLCX    Radiology Data:   Imaging Results (Last 24 Hours)     ** No results found for the last 24 hours. **          I have reviewed the patient's current medications.     Assessment/Plan     Active Hospital Problems    Diagnosis    • ARDS (adult respiratory distress syndrome) (CMS/[facility])      Labs reviewed: hyperglycemia likely due to steroids    Telemetry reviewed    Telemetry independently interpreted by me: NSR    1.  Vape induced lung injury  -IV Steroids  -nebs  -pulm following     2.  ?Pneumonia  -IV abx  -Blood/sputum cultures    3.  Acute Hypoxic respiratory failure  -IV abx  -IV steroids  -nebs  -pulm following  -supplemental oxygen, wean as tolerated    4.  Hyperglycemia  -likely due to steroids  -monitor            Discharge Planning: I expect the patient to be discharged to home in 2-3 days      Electronically signed by Morgan Beckham MD, 06/02/21, 10:39 CDT.

## 2021-06-03 ENCOUNTER — APPOINTMENT (OUTPATIENT)
Dept: GENERAL RADIOLOGY | Facility: HOSPITAL | Age: 21
End: 2021-06-03

## 2021-06-03 LAB
ALBUMIN SERPL-MCNC: 3.4 G/DL (ref 3.5–5.2)
ALBUMIN/GLOB SERPL: 1 G/DL
ALP SERPL-CCNC: 78 U/L (ref 39–117)
ALT SERPL W P-5'-P-CCNC: 64 U/L (ref 1–41)
ANION GAP SERPL CALCULATED.3IONS-SCNC: 11 MMOL/L (ref 5–15)
AST SERPL-CCNC: 38 U/L (ref 1–40)
BASOPHILS # BLD AUTO: 0.04 10*3/MM3 (ref 0–0.2)
BASOPHILS NFR BLD AUTO: 0.3 % (ref 0–1.5)
BILIRUB SERPL-MCNC: 0.3 MG/DL (ref 0–1.2)
BUN SERPL-MCNC: 11 MG/DL (ref 6–20)
BUN/CREAT SERPL: 19.3 (ref 7–25)
CALCIUM SPEC-SCNC: 9 MG/DL (ref 8.6–10.5)
CHLORIDE SERPL-SCNC: 100 MMOL/L (ref 98–107)
CO2 SERPL-SCNC: 28 MMOL/L (ref 22–29)
CREAT SERPL-MCNC: 0.57 MG/DL (ref 0.76–1.27)
DEPRECATED RDW RBC AUTO: 36.4 FL (ref 37–54)
EOSINOPHIL # BLD AUTO: 0.03 10*3/MM3 (ref 0–0.4)
EOSINOPHIL NFR BLD AUTO: 0.2 % (ref 0.3–6.2)
ERYTHROCYTE [DISTWIDTH] IN BLOOD BY AUTOMATED COUNT: 11.9 % (ref 12.3–15.4)
GFR SERPL CREATININE-BSD FRML MDRD: >150 ML/MIN/1.73
GLOBULIN UR ELPH-MCNC: 3.4 GM/DL
GLUCOSE SERPL-MCNC: 152 MG/DL (ref 65–99)
HCT VFR BLD AUTO: 37.5 % (ref 37.5–51)
HGB BLD-MCNC: 12.8 G/DL (ref 13–17.7)
IMM GRANULOCYTES # BLD AUTO: 0.14 10*3/MM3 (ref 0–0.05)
IMM GRANULOCYTES NFR BLD AUTO: 1.2 % (ref 0–0.5)
LYMPHOCYTES # BLD AUTO: 1.22 10*3/MM3 (ref 0.7–3.1)
LYMPHOCYTES NFR BLD AUTO: 10.1 % (ref 19.6–45.3)
MCH RBC QN AUTO: 28.8 PG (ref 26.6–33)
MCHC RBC AUTO-ENTMCNC: 34.1 G/DL (ref 31.5–35.7)
MCV RBC AUTO: 84.5 FL (ref 79–97)
MONOCYTES # BLD AUTO: 0.6 10*3/MM3 (ref 0.1–0.9)
MONOCYTES NFR BLD AUTO: 5 % (ref 5–12)
NEUTROPHILS NFR BLD AUTO: 10.02 10*3/MM3 (ref 1.7–7)
NEUTROPHILS NFR BLD AUTO: 83.2 % (ref 42.7–76)
NRBC BLD AUTO-RTO: 0 /100 WBC (ref 0–0.2)
PLATELET # BLD AUTO: 527 10*3/MM3 (ref 140–450)
PMV BLD AUTO: 9.9 FL (ref 6–12)
POTASSIUM SERPL-SCNC: 3.8 MMOL/L (ref 3.5–5.2)
PROCALCITONIN SERPL-MCNC: 0.7 NG/ML (ref 0–0.25)
PROT SERPL-MCNC: 6.8 G/DL (ref 6–8.5)
RBC # BLD AUTO: 4.44 10*6/MM3 (ref 4.14–5.8)
SODIUM SERPL-SCNC: 139 MMOL/L (ref 136–145)
WBC # BLD AUTO: 12.05 10*3/MM3 (ref 3.4–10.8)

## 2021-06-03 PROCEDURE — 94799 UNLISTED PULMONARY SVC/PX: CPT

## 2021-06-03 PROCEDURE — 71045 X-RAY EXAM CHEST 1 VIEW: CPT

## 2021-06-03 PROCEDURE — 25010000002 CEFTRIAXONE PER 250 MG: Performed by: FAMILY MEDICINE

## 2021-06-03 PROCEDURE — 85025 COMPLETE CBC W/AUTO DIFF WBC: CPT | Performed by: FAMILY MEDICINE

## 2021-06-03 PROCEDURE — 80053 COMPREHEN METABOLIC PANEL: CPT | Performed by: FAMILY MEDICINE

## 2021-06-03 PROCEDURE — 84145 PROCALCITONIN (PCT): CPT | Performed by: FAMILY MEDICINE

## 2021-06-03 PROCEDURE — 63710000001 PREDNISONE PER 1 MG: Performed by: NURSE PRACTITIONER

## 2021-06-03 PROCEDURE — 99232 SBSQ HOSP IP/OBS MODERATE 35: CPT | Performed by: INTERNAL MEDICINE

## 2021-06-03 RX ADMIN — PREDNISONE 20 MG: 20 TABLET ORAL at 07:43

## 2021-06-03 RX ADMIN — PREDNISONE 20 MG: 20 TABLET ORAL at 17:45

## 2021-06-03 RX ADMIN — IPRATROPIUM BROMIDE AND ALBUTEROL SULFATE 3 ML: 2.5; .5 SOLUTION RESPIRATORY (INHALATION) at 10:49

## 2021-06-03 RX ADMIN — IPRATROPIUM BROMIDE AND ALBUTEROL SULFATE 3 ML: 2.5; .5 SOLUTION RESPIRATORY (INHALATION) at 06:33

## 2021-06-03 RX ADMIN — PREDNISONE 20 MG: 20 TABLET ORAL at 13:21

## 2021-06-03 RX ADMIN — IPRATROPIUM BROMIDE AND ALBUTEROL SULFATE 3 ML: 2.5; .5 SOLUTION RESPIRATORY (INHALATION) at 14:06

## 2021-06-03 RX ADMIN — CEFTRIAXONE SODIUM 1 G: 1 INJECTION, POWDER, FOR SOLUTION INTRAMUSCULAR; INTRAVENOUS at 15:34

## 2021-06-03 RX ADMIN — IPRATROPIUM BROMIDE AND ALBUTEROL SULFATE 3 ML: 2.5; .5 SOLUTION RESPIRATORY (INHALATION) at 21:30

## 2021-06-03 NOTE — PROGRESS NOTES
[facility] Medicine Services  INPATIENT PROGRESS NOTE    Patient Name: Adi Dougherty  Date of Admission: 5/31/2021  Today's Date: 06/03/21  Length of Stay: 3  Primary Care Physician: Rhea Grider APRN    Subjective   Chief Complaint: SOA    Feels better, much less SOA.  Down to 2L  Afebrile        Review of Systems   Constitutional: Positive for fatigue and fever.   HENT: Negative for congestion and ear pain.    Eyes: Negative for redness and visual disturbance.   Respiratory: Positive for cough and shortness of breath. Negative for wheezing.    Cardiovascular: Negative for chest pain and palpitations.   Gastrointestinal: Negative for abdominal pain, diarrhea, nausea and vomiting.   Endocrine: Negative for cold intolerance and heat intolerance.   Genitourinary: Negative for dysuria and frequency.   Musculoskeletal: Negative for arthralgias and back pain.   Skin: Negative for rash and wound.   Neurological: Negative for dizziness and headaches.   Psychiatric/Behavioral: Negative for confusion. The patient is not nervous/anxious.         All pertinent negatives and positives are as above. All other systems have been reviewed and are negative unless otherwise stated.     Objective    Temp:  [98 °F (36.7 °C)-98.2 °F (36.8 °C)] 98.2 °F (36.8 °C)  Heart Rate:  [] 82  Resp:  [13-33] 20  BP: (116-133)/(62-80) 119/63  Physical Exam  Vitals reviewed.   Constitutional:       Appearance: He is well-developed.   HENT:      Head: Normocephalic and atraumatic.      Right Ear: External ear normal.      Left Ear: External ear normal.      Nose: Nose normal.   Eyes:      General: No scleral icterus.        Right eye: No discharge.         Left eye: No discharge.      Conjunctiva/sclera: Conjunctivae normal.      Pupils: Pupils are equal, round, and reactive to light.   Neck:      Thyroid: No thyromegaly.      Trachea: No tracheal deviation.   Cardiovascular:      Rate and Rhythm:  Normal rate and regular rhythm.      Heart sounds: Normal heart sounds. No murmur heard.   No friction rub. No gallop.    Pulmonary:      Effort: Pulmonary effort is normal. No respiratory distress.      Breath sounds: No stridor. Decreased breath sounds and rhonchi present. No wheezing or rales.   Chest:      Chest wall: No tenderness.   Abdominal:      General: Bowel sounds are normal. There is no distension.      Palpations: Abdomen is soft. There is no mass.      Tenderness: There is no abdominal tenderness. There is no guarding or rebound.      Hernia: No hernia is present.   Musculoskeletal:         General: No deformity. Normal range of motion.      Cervical back: Normal range of motion and neck supple.   Lymphadenopathy:      Cervical: No cervical adenopathy.   Skin:     General: Skin is warm and dry.      Coloration: Skin is not pale.      Findings: No erythema or rash.   Neurological:      Mental Status: He is alert and oriented to person, place, and time.      Cranial Nerves: No cranial nerve deficit.      Motor: No abnormal muscle tone.      Coordination: Coordination normal.      Deep Tendon Reflexes: Reflexes are normal and symmetric. Reflexes normal.   Psychiatric:         Behavior: Behavior normal.         Thought Content: Thought content normal.         Judgment: Judgment normal.             Results Review:  I have reviewed the labs, radiology results, and diagnostic studies.    Laboratory Data:   Results from last 7 days   Lab Units 06/03/21 0227 06/02/21  0306 06/01/21  0239   WBC 10*3/mm3 12.05* 17.99* 10.01   HEMOGLOBIN g/dL 12.8* 13.6 13.3   HEMATOCRIT % 37.5 39.1 38.4   PLATELETS 10*3/mm3 527* 516* 393        Results from last 7 days   Lab Units 06/03/21 0227 06/02/21  0306 06/01/21  0239   SODIUM mmol/L 139 140 135*   POTASSIUM mmol/L 3.8 3.6 4.2   CHLORIDE mmol/L 100 98 97*   CO2 mmol/L 28.0 30.0* 29.0   BUN mg/dL 11 13 12   CREATININE mg/dL 0.57* 0.68* 0.80   CALCIUM mg/dL 9.0 9.0 9.1    BILIRUBIN mg/dL 0.3 0.3 0.5   ALK PHOS U/L 78 84 85   ALT (SGPT) U/L 64* 51* 24   AST (SGOT) U/L 38 60* 40   GLUCOSE mg/dL 152* 146* 163*       Culture Data:   No results found for: BLOODCX, URINECX, WOUNDCX, MRSACX, RESPCX, STOOLCX    Radiology Data:   Imaging Results (Last 24 Hours)     Procedure Component Value Units Date/Time    XR Chest 1 View [[id number]] Collected: 06/03/21 0723     Updated: 06/03/21 0726    Narrative:      EXAMINATION:  XR CHEST 1 VW-  6/3/2021 6:56 AM CDT     HISTORY: J80-Acute respiratory distress syndrome     COMPARISON: 5/31/2021.     FINDINGS:  Bilateral infiltrates are markedly improved and nearly  resolved. There is no pleural effusion. The heart is normal in size. No  acute bony abnormality is seen.       Impression:      Near complete resolution of bilateral infiltrates with  minimal residual.        This report was finalized on 06/03/2021 07:23 by Dr. Hossein Razo MD.          I have reviewed the patient's current medications.     Assessment/Plan     Active Hospital Problems    Diagnosis    • ARDS (adult respiratory distress syndrome) (CMS/[facility])      Labs reviewed: hyperglycemia likely due to steroids    Telemetry reviewed    Telemetry independently interpreted by me: NSR    1.  Vape induced lung injury  -IV Steroids  -nebs  -pulm following     2.  ?Pneumonia  -IV abx--cultures negative to date, recheck procalcitonin  -Blood/sputum cultures    3.  Acute Hypoxic respiratory failure  -IV abx  -IV steroids  -nebs  -pulm following  -supplemental oxygen, wean as tolerated    4.  Hyperglycemia  -likely due to steroids  -monitor            Discharge Planning: I expect the patient to be discharged to home in 2-3 days      Electronically signed by Morgan Beckham MD, 06/03/21, 09:25 CDT.

## 2021-06-03 NOTE — NURSING NOTE
Pt received from ICU, alert et oriented x 4.  Vital signs stable.  Lung sounds clear, oxygen at 2l/nc sats wnl.  Pt denies any shortness of air.  Skin w/d/i.

## 2021-06-03 NOTE — PROGRESS NOTES
PULMONARY AND CRITICAL CARE PROGRESS NOTE - [facility]    Patient: Adi Dougherty  2000   MR# [id number]   Acct# [id number]  06/03/21   06:39 CDT  Referring Provider: Morgan Beckham MD    Chief Complaint: Shortness of breath with hypoxia    Interval history: He remains remains in ICU because there are no floor beds available.  He is on 2 L of oxygen with a saturation of 96.  He denies being short of breath.  He has a dry cough.  He has been moving around the room however he has not ambulated in the joe yet.  No new complaints.      Meds:  cefTRIAXone, 1 g, Intravenous, Q24H  ipratropium-albuterol, 3 mL, Nebulization, 4x Daily - RT  predniSONE, 20 mg, Oral, TID With Meals         Review of Systems:   Review of Systems   Constitutional: Negative.    HENT: Negative.    Respiratory: Positive for shortness of breath. Negative for cough.    Cardiovascular: Negative.    Gastrointestinal: Negative.    Skin: Negative.    Neurological: Negative.    Psychiatric/Behavioral: Negative.      Physical Exam:  SpO2 Percentage    06/03/21 0500 06/03/21 0600 06/03/21 0633   SpO2: 95% 95% 95%     Temp:  [98 °F (36.7 °C)-98.4 °F (36.9 °C)] 98 °F (36.7 °C)  Heart Rate:  [] 66  Resp:  [13-33] 22  BP: (116-133)/(61-80) 121/80    Intake/Output Summary (Last 24 hours) at 6/3/2021 0639  Last data filed at 6/3/2021 0000  Gross per 24 hour   Intake 480 ml   Output --   Net 480 ml     Physical Exam  Vitals and nursing note reviewed.   Constitutional:       Appearance: He is well-developed.      Interventions: Nasal cannula in place.   HENT:      Head: Normocephalic and atraumatic.   Eyes:      Pupils: Pupils are equal, round, and reactive to light.   Cardiovascular:      Rate and Rhythm: Normal rate and regular rhythm.   Pulmonary:      Effort: Pulmonary effort is normal.      Breath sounds: Normal breath sounds.   Abdominal:      General: Bowel sounds are normal. There is no distension.      Palpations:  Abdomen is soft.   Musculoskeletal:         General: Normal range of motion.      Cervical back: Normal range of motion and neck supple.   Skin:     General: Skin is warm and dry.   Neurological:      Mental Status: He is alert and oriented to person, place, and time.       Laboratory Data:  Results from last 7 days   Lab Units 06/03/21  0227 06/02/21  0306 06/01/21  0239   WBC 10*3/mm3 12.05* 17.99* 10.01   HEMOGLOBIN g/dL 12.8* 13.6 13.3   PLATELETS 10*3/mm3 527* 516* 393     Results from last 7 days   Lab Units 06/03/21  0227 06/02/21  0306 06/01/21  0239   SODIUM mmol/L 139 140 135*   POTASSIUM mmol/L 3.8 3.6 4.2   BUN mg/dL 11 13 12   CREATININE mg/dL 0.57* 0.68* 0.80     Results from last 7 days   Lab Units 05/31/21  1348   PH, ARTERIAL pH units 7.471*   PCO2, ARTERIAL mm Hg 37.4   PO2 ART mm Hg 50.2*     Blood Culture   Date Value Ref Range Status   05/31/2021 No growth at 24 hours  Preliminary   05/31/2021 No growth at 24 hours  Preliminary     Recent films:  No radiology results for the last day  Films reviewed personally by me.  My interpretation: No new today    Pulmonary Assessment:    Acute respiratory failure with hypoxia due to EVALI  Leukocytosis, likely related to steroids  Hyperglycemia, also likely due to steroids  Recreational use of THC-containing vapor products    Recommend:     Wean off of oxygen if able, currently on 2 L   Okay to the floor when a bed is available   Ambulate with oxygen if needed  Continue Rocephin day 3, can convert to oral when ready for discharge  Discontinue azithromycin   Continue prednisone and taper appropriately   Follow-up chest x-ray today   If he continues to show improvement he is okay for discharge from pulmonary perspective.  Will need follow-up in the office in a few weeks     Electronically signed by DEMETRA Stapleton, 06/03/21, 06:39 CDT     Physician substantive portion:  He is in no distress today.  He is on minimal oxygen.  He is awake and alert.  He  asks if he can go back to starting to run again.  I told him to take it easy work on walking and progress after that.  He would do much better if he does not use any of these vaping products in the future as well.  Recommend following discharge to complete a course of antibiotics for 5 to 7 days.  Discontinue azithromycin today as it is finished.  He could take 3-4 more days of cefdinir following discharge.  We will sign off, following up in office.  We will check PFTs and follow-up x-ray down the road.    I have seen and examined patient personally, performing a face-to-face diagnostic evaluation with plan of care reviewed and developed with APRN and nursing staff. I have addended and/or modified the above history of present illness, physical examination, and assessment and plan to reflect my findings and impressions. Essential elements of the care plan were discussed with APRN above.  Agree with findings and assessment/plan as documented above.    Electronically signed by Ceferino Webb MD, on 6/3/2021, 09:16 CDT

## 2021-06-04 VITALS
SYSTOLIC BLOOD PRESSURE: 124 MMHG | RESPIRATION RATE: 16 BRPM | TEMPERATURE: 97.6 F | DIASTOLIC BLOOD PRESSURE: 69 MMHG | OXYGEN SATURATION: 97 % | BODY MASS INDEX: 25.49 KG/M2 | WEIGHT: 188.2 LBS | HEIGHT: 72 IN | HEART RATE: 77 BPM

## 2021-06-04 LAB
ALBUMIN SERPL-MCNC: 3.3 G/DL (ref 3.5–5.2)
ALBUMIN/GLOB SERPL: 1 G/DL
ALP SERPL-CCNC: 72 U/L (ref 39–117)
ALT SERPL W P-5'-P-CCNC: 57 U/L (ref 1–41)
ANION GAP SERPL CALCULATED.3IONS-SCNC: 8 MMOL/L (ref 5–15)
AST SERPL-CCNC: 25 U/L (ref 1–40)
BASOPHILS # BLD MANUAL: 0.1 10*3/MM3 (ref 0–0.2)
BASOPHILS NFR BLD AUTO: 1 % (ref 0–1.5)
BILIRUB SERPL-MCNC: 0.3 MG/DL (ref 0–1.2)
BUN SERPL-MCNC: 11 MG/DL (ref 6–20)
BUN/CREAT SERPL: 20 (ref 7–25)
CALCIUM SPEC-SCNC: 9.1 MG/DL (ref 8.6–10.5)
CHLORIDE SERPL-SCNC: 104 MMOL/L (ref 98–107)
CO2 SERPL-SCNC: 26 MMOL/L (ref 22–29)
CREAT SERPL-MCNC: 0.55 MG/DL (ref 0.76–1.27)
DEPRECATED RDW RBC AUTO: 38.6 FL (ref 37–54)
EOSINOPHIL # BLD MANUAL: 0.1 10*3/MM3 (ref 0–0.4)
EOSINOPHIL NFR BLD MANUAL: 1 % (ref 0.3–6.2)
ERYTHROCYTE [DISTWIDTH] IN BLOOD BY AUTOMATED COUNT: 12 % (ref 12.3–15.4)
GFR SERPL CREATININE-BSD FRML MDRD: >150 ML/MIN/1.73
GLOBULIN UR ELPH-MCNC: 3.3 GM/DL
GLUCOSE SERPL-MCNC: 132 MG/DL (ref 65–99)
HCT VFR BLD AUTO: 42.4 % (ref 37.5–51)
HGB BLD-MCNC: 14.2 G/DL (ref 13–17.7)
LYMPHOCYTES # BLD MANUAL: 1.59 10*3/MM3 (ref 0.7–3.1)
LYMPHOCYTES NFR BLD MANUAL: 15.2 % (ref 19.6–45.3)
LYMPHOCYTES NFR BLD MANUAL: 3 % (ref 5–12)
MCH RBC QN AUTO: 29.2 PG (ref 26.6–33)
MCHC RBC AUTO-ENTMCNC: 33.5 G/DL (ref 31.5–35.7)
MCV RBC AUTO: 87.2 FL (ref 79–97)
MONOCYTES # BLD AUTO: 0.31 10*3/MM3 (ref 0.1–0.9)
NEUTROPHILS # BLD AUTO: 8.35 10*3/MM3 (ref 1.7–7)
NEUTROPHILS NFR BLD MANUAL: 76.8 % (ref 42.7–76)
NEUTS BAND NFR BLD MANUAL: 3 % (ref 0–5)
PLATELET # BLD AUTO: 520 10*3/MM3 (ref 140–450)
PMV BLD AUTO: 10 FL (ref 6–12)
POLYCHROMASIA BLD QL SMEAR: ABNORMAL
POTASSIUM SERPL-SCNC: 4.2 MMOL/L (ref 3.5–5.2)
PROT SERPL-MCNC: 6.6 G/DL (ref 6–8.5)
RBC # BLD AUTO: 4.86 10*6/MM3 (ref 4.14–5.8)
SMALL PLATELETS BLD QL SMEAR: ABNORMAL
SODIUM SERPL-SCNC: 138 MMOL/L (ref 136–145)
WBC # BLD AUTO: 10.46 10*3/MM3 (ref 3.4–10.8)
WBC MORPH BLD: NORMAL

## 2021-06-04 PROCEDURE — 63710000001 PREDNISONE PER 1 MG: Performed by: NURSE PRACTITIONER

## 2021-06-04 PROCEDURE — 85025 COMPLETE CBC W/AUTO DIFF WBC: CPT | Performed by: FAMILY MEDICINE

## 2021-06-04 PROCEDURE — 85007 BL SMEAR W/DIFF WBC COUNT: CPT | Performed by: FAMILY MEDICINE

## 2021-06-04 PROCEDURE — 36415 COLL VENOUS BLD VENIPUNCTURE: CPT | Performed by: FAMILY MEDICINE

## 2021-06-04 PROCEDURE — 80053 COMPREHEN METABOLIC PANEL: CPT | Performed by: FAMILY MEDICINE

## 2021-06-04 PROCEDURE — 94799 UNLISTED PULMONARY SVC/PX: CPT

## 2021-06-04 RX ORDER — CEFDINIR 300 MG/1
300 CAPSULE ORAL 2 TIMES DAILY
Qty: 6 CAPSULE | Refills: 0 | Status: SHIPPED | OUTPATIENT
Start: 2021-06-04 | End: 2022-04-24

## 2021-06-04 RX ORDER — PREDNISONE 10 MG/1
TABLET ORAL
Qty: 30 TABLET | Refills: 0 | Status: SHIPPED | OUTPATIENT
Start: 2021-06-04 | End: 2021-06-16

## 2021-06-04 RX ORDER — CALCIUM CARBONATE 200(500)MG
2 TABLET,CHEWABLE ORAL EVERY 6 HOURS PRN
Status: DISCONTINUED | OUTPATIENT
Start: 2021-06-04 | End: 2021-06-04 | Stop reason: HOSPADM

## 2021-06-04 RX ADMIN — PREDNISONE 20 MG: 20 TABLET ORAL at 08:27

## 2021-06-04 RX ADMIN — IPRATROPIUM BROMIDE AND ALBUTEROL SULFATE 3 ML: 2.5; .5 SOLUTION RESPIRATORY (INHALATION) at 06:30

## 2021-06-04 RX ADMIN — IPRATROPIUM BROMIDE AND ALBUTEROL SULFATE 3 ML: 2.5; .5 SOLUTION RESPIRATORY (INHALATION) at 14:54

## 2021-06-04 RX ADMIN — PREDNISONE 20 MG: 20 TABLET ORAL at 12:53

## 2021-06-04 RX ADMIN — IPRATROPIUM BROMIDE AND ALBUTEROL SULFATE 3 ML: 2.5; .5 SOLUTION RESPIRATORY (INHALATION) at 10:21

## 2021-06-04 NOTE — DISCHARGE SUMMARY
[facility] Medicine Services  DISCHARGE SUMMARY       Date of Admission: 5/31/2021  Date of Discharge:  6/4/2021  Primary Care Physician: Rhea Grider APRN    Presenting Problem/History of Present Illness:  ARDS (adult respiratory distress syndrome) (CMS/[facility]) [J80]     Final Discharge Diagnoses:  Active Hospital Problems    Diagnosis    • ARDS (adult respiratory distress syndrome) (CMS/[facility])    1.  Vape induced lung injury    2.  ?Pneumonia     3.  Acute Hypoxic respiratory failure    4.  Hyperglycemia      Consults:   1.  Pulmonology    Procedures Performed: n/a    Pertinent Test Results:   CTA Chest:     IMPRESSION:  1. No evidence of pulmonary embolus.  2. Bilateral, nearly diffuse pulmonary opacities with septal thickening,  groundglass attenuation and developing consolidation in the dependent  portions. This seems most likely ARDS, secondary to atypical pulmonary  infection, sepsis or drug or chemical exposure.    Chief Complaint on Day of Discharge: Feeling better    History of Present Illness on Day of Discharge:   Doing well, afebrile, no SOA.  Tolerating room air     Hospital Course:  The patient is a 21 y.o. male who presented to [facility] with SOA and fever.  He had a vaping induced lung injury as well as a superimposed bacterial pneumonia.  He was treated with IV abx and IV steroids.  Pulmonology was consulted.  He initially required high flow oxygen.  He improved over the next few days.  He is currently afebrile and tolerating room air.  He has been weaned to oral steroids and continues to improve.      He is comfortable with being discharged and with the discharge plan.  He was given the chance to ask questions and all questions were answered to his satisfaction.        Condition on Discharge:  stable    Physical Exam on Discharge:  /69 (BP Location: Right arm, Patient Position: Sitting)   Pulse 77   Temp 97.6 °F (36.4 °C) (Oral)   Resp  "16   Ht 182.9 cm (72.01\")   Wt 85.4 kg (188 lb 3.2 oz)   SpO2 97%   BMI 25.52 kg/m²   Physical Exam  Vitals reviewed.   Constitutional:       Appearance: He is well-developed.   HENT:      Head: Normocephalic and atraumatic.      Right Ear: External ear normal.      Left Ear: External ear normal.      Nose: Nose normal.   Eyes:      General: No scleral icterus.        Right eye: No discharge.         Left eye: No discharge.      Conjunctiva/sclera: Conjunctivae normal.      Pupils: Pupils are equal, round, and reactive to light.   Neck:      Thyroid: No thyromegaly.      Trachea: No tracheal deviation.   Cardiovascular:      Rate and Rhythm: Normal rate and regular rhythm.      Heart sounds: Normal heart sounds. No murmur heard.   No friction rub. No gallop.    Pulmonary:      Effort: Pulmonary effort is normal. No respiratory distress.      Breath sounds: Normal breath sounds. No stridor. No wheezing or rales.   Chest:      Chest wall: No tenderness.   Abdominal:      General: Bowel sounds are normal. There is no distension.      Palpations: Abdomen is soft. There is no mass.      Tenderness: There is no abdominal tenderness. There is no guarding or rebound.      Hernia: No hernia is present.   Musculoskeletal:         General: No deformity. Normal range of motion.      Cervical back: Normal range of motion and neck supple.   Lymphadenopathy:      Cervical: No cervical adenopathy.   Skin:     General: Skin is warm and dry.      Coloration: Skin is not pale.      Findings: No erythema or rash.   Neurological:      Mental Status: He is alert and oriented to person, place, and time.      Cranial Nerves: No cranial nerve deficit.      Motor: No abnormal muscle tone.      Coordination: Coordination normal.      Deep Tendon Reflexes: Reflexes are normal and symmetric. Reflexes normal.   Psychiatric:         Behavior: Behavior normal.         Thought Content: Thought content normal.         Judgment: Judgment normal. "           Discharge Disposition:  Home or Self Care    Discharge Medications:     Discharge Medications      New Medications      Instructions Start Date   cefdinir 300 MG capsule  Commonly known as: OMNICEF   300 mg, Oral, 2 Times Daily      predniSONE 10 MG tablet  Commonly known as: DELTASONE   4 tabs daily x 3 days, then 3 tabs daily x 3 days, then 2 tabs daily x 3 days, then 1 tab daily x 3 days             Discharge Diet: regular    Activity at Discharge: as tolerated    Discharge Care Plan/Instructions:   Go to ER for fever or SOA    Follow-up Appointments:   No future appointments.    Test Results Pending at Discharge: n/a    Electronically signed by Morgan Beckham MD, 06/04/21, 15:49 CDT.    Time: 35 minutes

## 2021-06-04 NOTE — CASE MANAGEMENT/SOCIAL WORK
Continued Stay Note  IDALIA Cisneros     Patient Name: Adi Dougherty  MRN: [id number]  Today's Date: 6/4/2021    Admit Date: 5/31/2021    Discharge Plan     Row Name 06/04/21 0910       Plan    Plan  HOME    Patient/Family in Agreement with Plan  yes    Plan Comments  REVIEWED CHART; EVENTS NOTED.  PT. PLANS TO D/C HOME WITH NO D/C PLANNING NEEDS NOTED, PLEASE ADVISE IF SITUATION CHANGES.    Final Discharge Disposition Code  01 - home or self-care        Discharge Codes    No documentation.             SURINDER Prasad

## 2021-06-05 ENCOUNTER — READMISSION MANAGEMENT (OUTPATIENT)
Dept: CALL CENTER | Facility: HOSPITAL | Age: 21
End: 2021-06-05

## 2021-06-05 LAB
BACTERIA SPEC AEROBE CULT: NORMAL
BACTERIA SPEC AEROBE CULT: NORMAL

## 2021-06-05 NOTE — OUTREACH NOTE
Prep Survey      Responses   [facility] facility patient discharged from?  [place]   Is LACE score < 7 ?  No   Emergency Room discharge w/ pulse ox?  No   Eligibility  Readm Mgmt   Discharge diagnosis  Vape induced lung injury, ?Pneumonia   Does the patient have one of the following disease processes/diagnoses(primary or secondary)?  COPD/Pneumonia   Does the patient have Home health ordered?  No   Is there a DME ordered?  No   Prep survey completed?  Yes          Saundra Nj RN

## 2021-06-08 ENCOUNTER — TELEPHONE (OUTPATIENT)
Dept: PRIMARY CARE CLINIC | Age: 21
End: 2021-06-08

## 2021-06-08 NOTE — TELEPHONE ENCOUNTER
[facility] Transitions Initial Follow Up Call    Outreach made within 2 business days of discharge: Yes    Patient: Karuna Davis Patient : 2000   MRN: [id number]  Reason for Admission: ARDS, VAPE INDUCED LUNG INJURY    Discharge Date: 2021      Spoke with: NO ONE ANSWERED PHONE. Discharge department/facility: [facility].        Scheduled appointment with PCP within 7-14 days    Follow Up  Future Appointments   Date Time Provider Ally Hernandez   2021  2:30 PM [facility]       Renay Hurst LPN

## 2021-06-09 ENCOUNTER — READMISSION MANAGEMENT (OUTPATIENT)
Dept: CALL CENTER | Facility: HOSPITAL | Age: 21
End: 2021-06-09

## 2021-06-09 NOTE — OUTREACH NOTE
COPD/PN Week 1 Survey      Responses   [facility] patient discharged from?  [place]   Does the patient have one of the following disease processes/diagnoses(primary or secondary)?  COPD/Pneumonia   Was the primary reason for admission:  Pneumonia   Week 1 attempt successful?  Yes   Call start time  1318   Rescheduled  Revoked   Revoke  Phone number issues   Call end time  1314          Tracey Rondon RN

## 2022-01-04 ENCOUNTER — OFFICE VISIT (OUTPATIENT)
Dept: PRIMARY CARE CLINIC | Age: 22
End: 2022-01-04
Payer: MEDICAID

## 2022-01-04 VITALS
BODY MASS INDEX: 28.77 KG/M2 | OXYGEN SATURATION: 98 % | HEIGHT: 72 IN | HEART RATE: 83 BPM | TEMPERATURE: 98.5 F | SYSTOLIC BLOOD PRESSURE: 110 MMHG | RESPIRATION RATE: 16 BRPM | DIASTOLIC BLOOD PRESSURE: 80 MMHG | WEIGHT: 212.4 LBS

## 2022-01-04 DIAGNOSIS — F41.9 ANXIETY: Primary | ICD-10-CM

## 2022-01-04 PROCEDURE — 1036F TOBACCO NON-USER: CPT | Performed by: NURSE PRACTITIONER

## 2022-01-04 PROCEDURE — G8419 CALC BMI OUT NRM PARAM NOF/U: HCPCS | Performed by: NURSE PRACTITIONER

## 2022-01-04 PROCEDURE — G8484 FLU IMMUNIZE NO ADMIN: HCPCS | Performed by: NURSE PRACTITIONER

## 2022-01-04 PROCEDURE — G8427 DOCREV CUR MEDS BY ELIG CLIN: HCPCS | Performed by: NURSE PRACTITIONER

## 2022-01-04 PROCEDURE — 99213 OFFICE O/P EST LOW 20 MIN: CPT | Performed by: NURSE PRACTITIONER

## 2022-01-04 RX ORDER — IBUPROFEN 200 MG
400 TABLET ORAL PRN
COMMUNITY

## 2022-01-04 RX ORDER — BUSPIRONE HYDROCHLORIDE 10 MG/1
10 TABLET ORAL 2 TIMES DAILY
Qty: 60 TABLET | Refills: 3 | Status: SHIPPED | OUTPATIENT
Start: 2022-01-04 | End: 2022-02-03

## 2022-01-04 RX ORDER — ESCITALOPRAM OXALATE 10 MG/1
10 TABLET ORAL DAILY
Qty: 30 TABLET | Refills: 5 | Status: SHIPPED | OUTPATIENT
Start: 2022-01-04 | End: 2022-02-01 | Stop reason: ALTCHOICE

## 2022-01-04 ASSESSMENT — PATIENT HEALTH QUESTIONNAIRE - PHQ9
SUM OF ALL RESPONSES TO PHQ QUESTIONS 1-9: 0
SUM OF ALL RESPONSES TO PHQ9 QUESTIONS 1 & 2: 0
SUM OF ALL RESPONSES TO PHQ QUESTIONS 1-9: 0
SUM OF ALL RESPONSES TO PHQ QUESTIONS 1-9: 0
1. LITTLE INTEREST OR PLEASURE IN DOING THINGS: 0
2. FEELING DOWN, DEPRESSED OR HOPELESS: 0
SUM OF ALL RESPONSES TO PHQ QUESTIONS 1-9: 0

## 2022-01-04 NOTE — PATIENT INSTRUCTIONS
Start the medication, lexapro. You must take this medication daily. It will take about 10 days for you to notice a difference. If depression worsens or no emotion stop the medication and call me. The most common side effect is some nausea but this should subside in a few days. Natural ways to deal with stress is counseling, yoga, mediation ,exercise , and cleaning up diet to avoid process foods. Continue the working out  May use the buspar twice a day for anxiety daily or as needed.

## 2022-01-04 NOTE — PROGRESS NOTES
[address]  [address] [place] [address]  [address] [place] [address]  Dept: [phone]  Dept Fax: [phone]  Loc: [phone]    Sofia Arevalo is a 22 y.o. male who presents today for his medical conditions/complaints as noted below. Sofia Arevalo is c/o of Anxiety        HPI:     HPI   Chief Complaint   Patient presents with    Anxiety   He felt like he is always had anxiety but during high school he played sports it did not affect him as bad. Right now he is working a part-time job and living with his girlfriend. Little things stressed him out. He sometimes just feels like it would be easier to stay home then to go do anything because of the stress. Cannot really relate it to any certain circumstance. Just seems to be worse since he graduated. He denies any problems with drug abuse. Both of his parents had drug history and he does not want to go down that road. He and his girlfriend are getting along very well and looking at buying a house. 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 No pertinent past medical history. History reviewed. No pertinent surgical history. Vitals 1/4/2022 5/28/2021 7/15/2019 6/28/2019 7/12/2018 5/89/7070   SYSTOLIC 355 959 680 166 460 637   DIASTOLIC 80 75 72 69 68 47   Site - - - - Left Arm -   Position - - - - Sitting -   Cuff Size - - - - Large Adult -   Pulse 83 106 67 78 57 60   Temp 98.5 97.8 98.2 98.4 97.4 97.8   Resp 16 16 16 16 - 18   SpO2 98 95 99 98 98 98   Weight 212 lb 6.4 oz 192 lb 3.2 oz 178 lb 173 lb 164 lb 166 lb   Height 6' 0\" 6' 0\" 5' 11.26\" 6' 0\" 6' 0\" 5' 11\"   Body mass index 28.8 kg/m2 26.06 kg/m2 24.64 kg/m2 23.46 kg/m2 22.24 kg/m2 23.15 kg/m2   Some recent data might be hidden       History reviewed. No pertinent family history.     Social History     Tobacco Use    Smoking status: Never Smoker    Smokeless tobacco: Never Used   Substance Use Topics    Alcohol use: No     Alcohol/week: 0.0 standard drinks      Current Outpatient Medications on File Prior to Visit   Medication Sig Dispense Refill    ibuprofen (ADVIL;MOTRIN) 200 MG tablet Take 400 mg by mouth as needed for Pain       No current facility-administered medications on file prior to visit. No Known Allergies    Health Maintenance   Topic Date Due    COVID-19 Vaccine (1) Never done    Depression Screen  Never done    HIV screen  Never done    Flu vaccine (1) Never done    DTaP/Tdap/Td vaccine (7 - Td or Tdap) 07/17/2023    Hepatitis A vaccine  Completed    Hepatitis B vaccine  Completed    Hib vaccine  Completed    HPV vaccine  Completed    Varicella vaccine  Completed    Hepatitis C screen  Completed    Meningococcal (ACWY) vaccine  Aged Out    Pneumococcal 0-64 years Vaccine  Aged Out       Subjective:      Review of Systems   Constitutional: Negative. Psychiatric/Behavioral: Positive for dysphoric mood. Negative for agitation, behavioral problems, confusion, decreased concentration, hallucinations, self-injury, sleep disturbance and suicidal ideas. The patient is nervous/anxious. The patient is not hyperactive. Objective:     Physical Exam  Vitals and nursing note reviewed. Constitutional:       Appearance: He is well-developed. HENT:      Head: Normocephalic. Cardiovascular:      Rate and Rhythm: Normal rate and regular rhythm. Heart sounds: Normal heart sounds. Pulmonary:      Effort: Pulmonary effort is normal.      Breath sounds: Normal breath sounds. Skin:     General: Skin is warm and dry. Capillary Refill: Capillary refill takes less than 2 seconds. Neurological:      General: No focal deficit present. Mental Status: He is alert and oriented to person, place, and time. Psychiatric:         Mood and Affect: Mood normal.         Behavior: Behavior normal.         Thought Content:  Thought content normal.         Judgment: Judgment normal.       /80   Pulse 83   Temp 98.5 °F (36.9 °C)   Resp 16   Ht 6' (1.829 m)   Wt 212 lb 6.4 oz (96.3 kg)   SpO2 98%   BMI 28.81 kg/m²     Assessment:       Diagnosis Orders   1. Anxiety           Plan:   More than 50% of the time was spent counseling and coordinating care for a total time of 22min face to face. PDMP Monitoring:    Last PDMP Fabio as Reviewed [facility]):  Review User Review Instant Review Result            Urine Drug Screenings (1 yr)    No resulted procedures found. Medication Contract and Consent for Opioid Use Documents Filed      No documents found                 Patient given educational materials -see patient instructions. Discussed use, benefit, and side effects of prescribed medications. All patient questions answered. Pt voiced understanding. Reviewed health maintenance. Instructed to continue currentmedications, diet and exercise. Patient agreed with treatment plan. Follow up as directed. MEDICATIONS:  Orders Placed This Encounter   Medications    escitalopram (LEXAPRO) 10 MG tablet     Sig: Take 1 tablet by mouth daily     Dispense:  30 tablet     Refill:  5    busPIRone (BUSPAR) 10 MG tablet     Sig: Take 1 tablet by mouth 2 times daily For anxiety     Dispense:  60 tablet     Refill:  3         ORDERS:  No orders of the defined types were placed in this encounter. Follow-up:  Return in about 4 weeks (around 2/1/2022) for med check can be virtual.    PATIENT INSTRUCTIONS:  Patient Instructions   Start the medication, lexapro. You must take this medication daily. It will take about 10 days for you to notice a difference. If depression worsens or no emotion stop the medication and call me. The most common side effect is some nausea but this should subside in a few days. Natural ways to deal with stress is counseling, yoga, mediation ,exercise , and cleaning up diet to avoid process foods. Continue the working out  May use the buspar twice a day for anxiety daily or as needed.      Electronically signed by HERB Ayon CNP on 1/4/2022 at 5:09 PM    EMR Dragon/transcription disclaimer:  Much of thisencounter note is electronic transcription/translation of spoken language to printed texts. The electronic translation of spoken language may be erroneous, or at times, nonsensical words or phrases may be inadvertentlytranscribed.   Although I have reviewed the note for such errors, some may still exist.

## 2022-02-01 ENCOUNTER — OFFICE VISIT (OUTPATIENT)
Dept: PRIMARY CARE CLINIC | Age: 22
End: 2022-02-01
Payer: MEDICAID

## 2022-02-01 VITALS
DIASTOLIC BLOOD PRESSURE: 72 MMHG | OXYGEN SATURATION: 99 % | TEMPERATURE: 97.5 F | HEIGHT: 72 IN | BODY MASS INDEX: 28.85 KG/M2 | HEART RATE: 78 BPM | WEIGHT: 213 LBS | SYSTOLIC BLOOD PRESSURE: 130 MMHG

## 2022-02-01 DIAGNOSIS — F41.9 ANXIETY: Primary | ICD-10-CM

## 2022-02-01 PROCEDURE — 99213 OFFICE O/P EST LOW 20 MIN: CPT | Performed by: NURSE PRACTITIONER

## 2022-02-01 PROCEDURE — G8419 CALC BMI OUT NRM PARAM NOF/U: HCPCS | Performed by: NURSE PRACTITIONER

## 2022-02-01 PROCEDURE — G8484 FLU IMMUNIZE NO ADMIN: HCPCS | Performed by: NURSE PRACTITIONER

## 2022-02-01 PROCEDURE — 1036F TOBACCO NON-USER: CPT | Performed by: NURSE PRACTITIONER

## 2022-02-01 PROCEDURE — G8427 DOCREV CUR MEDS BY ELIG CLIN: HCPCS | Performed by: NURSE PRACTITIONER

## 2022-02-01 RX ORDER — BUPROPION HYDROCHLORIDE 150 MG/1
150 TABLET ORAL EVERY MORNING
Qty: 30 TABLET | Refills: 3 | Status: SHIPPED | OUTPATIENT
Start: 2022-02-01

## 2022-02-01 NOTE — PATIENT INSTRUCTIONS
Stop the lexpro  Start the medication, wellbutrin. You must take this medication daily. It will take about 10 days for you to notice a difference. If depression worsens or no emotion stop the medication and call me. The most common side effect is some nausea but this should subside in a few days. Natural ways to deal with stress is counseling, yoga, mediation ,exercise , and cleaning up diet to avoid process foods. Keep exercising  If not better by 50% in 2-3 weeks call me and we will add something to this like  paxil or abilify. May still do the buspar as needed 10mg but hopefully wont need for long.

## 2022-02-01 NOTE — PROGRESS NOTES
[address] [facility]  [address] [place] [address]  [address] [place] [address]  Dept: [phone]  Dept Fax: [phone]  Loc: [phone]    Vihdi Carranza is a 22 y.o. male who presents today for his medical conditions/complaints as noted below. Vidhi Carranza is c/o of Follow-up (4-week-f/u. Anxiety. Patient has not noticed any relief to date.  )        HPI:     HPI   Chief Complaint   Patient presents with    Follow-up     4-week-f/u. Anxiety. Patient has not noticed any relief to date. At the last visit we started him on Lexapro and BuSpar. He says he has not noticed any difference since taking it. His complaint at the last visit was mainly anxiety. He said small things stressed him out. He avoided doing things because he was worried about the stress he would just soon stay home. He denied any drug history. He has not really noticed a difference with the Lexapro or BuSpar he feels about the same. He is still actively looking for a job. His relationships are good. He does not want to do counseling. Past Medical History:   Diagnosis Date    Anxiety       No past surgical history on file. Vitals 2/1/2022 1/4/2022 5/28/2021 7/15/2019 6/28/2019 2/95/7205   SYSTOLIC 792 814 481 502 033 557   DIASTOLIC 72 80 75 72 69 68   Site - - - - - Left Arm   Position - - - - - Sitting   Cuff Size - - - - - Large Adult   Pulse 78 83 106 67 78 57   Temp 97.5 98.5 97.8 98.2 98.4 97.4   Resp - 16 16 16 16 -   SpO2 99 98 95 99 98 98   Weight 213 lb 212 lb 6.4 oz 192 lb 3.2 oz 178 lb 173 lb 164 lb   Height 6' 0\" 6' 0\" 6' 0\" 5' 11.26\" 6' 0\" 6' 0\"   Body mass index 28.88 kg/m2 28.8 kg/m2 26.06 kg/m2 24.64 kg/m2 23.46 kg/m2 22.24 kg/m2   Some recent data might be hidden       No family history on file.     Social History     Tobacco Use    Smoking status: Never Smoker    Smokeless tobacco: Never Used   Substance Use Topics    Alcohol use: No     Alcohol/week: 0.0 standard drinks      Current Outpatient Medications on File Prior to Visit   Medication Sig Dispense Refill    ibuprofen (ADVIL;MOTRIN) 200 MG tablet Take 400 mg by mouth as needed for Pain      busPIRone (BUSPAR) 10 MG tablet Take 1 tablet by mouth 2 times daily For anxiety 60 tablet 3     No current facility-administered medications on file prior to visit. No Known Allergies    Health Maintenance   Topic Date Due    COVID-19 Vaccine (1) Never done    HIV screen  Never done    Flu vaccine (1) Never done    Depression Screen  01/04/2023    DTaP/Tdap/Td vaccine (7 - Td or Tdap) 07/17/2023    Hepatitis A vaccine  Completed    Hepatitis B vaccine  Completed    Hib vaccine  Completed    HPV vaccine  Completed    Varicella vaccine  Completed    Hepatitis C screen  Completed    Meningococcal (ACWY) vaccine  Aged Out    Pneumococcal 0-64 years Vaccine  Aged Out       Subjective:      Review of Systems   Constitutional: Negative. Psychiatric/Behavioral: Negative for dysphoric mood, self-injury, sleep disturbance and suicidal ideas. The patient is nervous/anxious. Objective:     Physical Exam  Vitals and nursing note reviewed. Constitutional:       Appearance: He is well-developed. HENT:      Head: Normocephalic. Cardiovascular:      Rate and Rhythm: Normal rate and regular rhythm. Heart sounds: Normal heart sounds. Pulmonary:      Effort: Pulmonary effort is normal.      Breath sounds: Normal breath sounds. Skin:     General: Skin is warm and dry. Capillary Refill: Capillary refill takes less than 2 seconds. Neurological:      General: No focal deficit present. Mental Status: He is alert and oriented to person, place, and time. Psychiatric:         Mood and Affect: Mood normal.         Behavior: Behavior normal.         Thought Content:  Thought content normal.         Judgment: Judgment normal.       /72   Pulse 78   Temp 97.5 °F (36.4 °C)   Ht 6' (1.829 m)   Wt 213 lb (96.6 kg) SpO2 99%   BMI 28.89 kg/m²     Assessment:       Diagnosis Orders   1. Anxiety           Plan:   More than 50% of the time was spent counseling and coordinating care for a total time of 20min face to face. PDMP Monitoring:    Last PDMP Fabio as Reviewed [facility]):  Review User Review Instant Review Result            Urine Drug Screenings (1 yr)    No resulted procedures found. Medication Contract and Consent for Opioid Use Documents Filed      No documents found                 Patient given educational materials -see patient instructions. Discussed use, benefit, and side effects of prescribed medications. All patient questions answered. Pt voiced understanding. Reviewed health maintenance. Instructed to continue currentmedications, diet and exercise. Patient agreed with treatment plan. Follow up as directed. MEDICATIONS:  Orders Placed This Encounter   Medications    buPROPion (WELLBUTRIN XL) 150 MG extended release tablet     Sig: Take 1 tablet by mouth every morning     Dispense:  30 tablet     Refill:  3         ORDERS:  No orders of the defined types were placed in this encounter. Follow-up:  No follow-ups on file. PATIENT INSTRUCTIONS:  Patient Instructions   Stop the lexpro  Start the medication, wellbutrin. You must take this medication daily. It will take about 10 days for you to notice a difference. If depression worsens or no emotion stop the medication and call me. The most common side effect is some nausea but this should subside in a few days. Natural ways to deal with stress is counseling, yoga, mediation ,exercise , and cleaning up diet to avoid process foods. Keep exercising  If not better by 50% in 2-3 weeks call me and we will add something to this like  paxil or abilify. May still do the buspar as needed 10mg but hopefully wont need for long.       Electronically signed by HERB Willard CNP on 2/1/2022 at 5:17 PM    EMR Dragon/transcription disclaimer:  Much of thisencounter note is electronic transcription/translation of spoken language to printed texts. The electronic translation of spoken language may be erroneous, or at times, nonsensical words or phrases may be inadvertentlytranscribed.   Although I have reviewed the note for such errors, some may still exist.

## 2022-04-24 PROCEDURE — 87635 SARS-COV-2 COVID-19 AMP PRB: CPT | Performed by: NURSE PRACTITIONER

## 2022-09-01 PROCEDURE — 87635 SARS-COV-2 COVID-19 AMP PRB: CPT | Performed by: NURSE PRACTITIONER
